# Patient Record
Sex: FEMALE | Race: WHITE | NOT HISPANIC OR LATINO | Employment: OTHER | URBAN - METROPOLITAN AREA
[De-identification: names, ages, dates, MRNs, and addresses within clinical notes are randomized per-mention and may not be internally consistent; named-entity substitution may affect disease eponyms.]

---

## 2018-08-05 ENCOUNTER — OFFICE VISIT (OUTPATIENT)
Dept: URGENT CARE | Age: 83
End: 2018-08-05
Payer: MEDICARE

## 2018-08-05 VITALS
WEIGHT: 106 LBS | TEMPERATURE: 97.7 F | HEART RATE: 60 BPM | HEIGHT: 62 IN | SYSTOLIC BLOOD PRESSURE: 112 MMHG | OXYGEN SATURATION: 97 % | BODY MASS INDEX: 19.51 KG/M2 | DIASTOLIC BLOOD PRESSURE: 57 MMHG | RESPIRATION RATE: 16 BRPM

## 2018-08-05 DIAGNOSIS — S50.811A FOREARM ABRASION, RIGHT, INITIAL ENCOUNTER: Primary | ICD-10-CM

## 2018-08-05 PROCEDURE — G0463 HOSPITAL OUTPT CLINIC VISIT: HCPCS | Performed by: PHYSICIAN ASSISTANT

## 2018-08-05 PROCEDURE — 99203 OFFICE O/P NEW LOW 30 MIN: CPT | Performed by: PHYSICIAN ASSISTANT

## 2018-08-05 RX ORDER — ASPIRIN 81 MG/1
81 TABLET ORAL DAILY
COMMUNITY
End: 2022-05-17

## 2018-08-05 NOTE — PROGRESS NOTES
[facility]        NAME: Adriane Lehman is a 80 y o  female  : 1934    MRN: [id number]  DATE: 2018  TIME: 8:03 PM    Assessment and Plan   Forearm abrasion, right, initial encounter [S50 [id number]]  1  Forearm abrasion, right, initial encounter           Patient Instructions       Follow up with PCP in 3-5 days  Proceed to  ER if symptoms worsen  Chief Complaint     Chief Complaint   Patient presents with   Veronika Heaton 83     since yesterday; no pain; only redness         History of Present Illness       A 3year-old female presents today with a abnormal looking eliseo on right forearm  Concerned that it potentially is a tick with a tick bite  No fevers or chills  Does remember doing anything to injure herself  No numbness or tingling in extremity      Other   This is a new problem  The current episode started yesterday  The problem occurs constantly  The problem has been unchanged  Pertinent negatives include no chest pain, coughing, fatigue, fever, nausea or visual change  Nothing aggravates the symptoms  She has tried nothing for the symptoms  The treatment provided no relief  Review of Systems   Review of Systems   Constitutional: Negative  Negative for fatigue and fever  HENT: Negative  Respiratory: Negative  Negative for cough  Cardiovascular: Negative  Negative for chest pain  Gastrointestinal: Negative for nausea  Musculoskeletal: Negative  Skin: Positive for wound           Current Medications       Current Outpatient Prescriptions:     aspirin (ECOTRIN LOW STRENGTH) 81 mg EC tablet, Take 81 mg by mouth daily, Disp: , Rfl:     metoprolol tartrate (LOPRESSOR) 25 mg tablet, Take 25 mg by mouth every 12 (twelve) hours, Disp: , Rfl:     Current Allergies     Allergies as of 2018    (No Known Allergies)            The following portions of the patient's history were reviewed and updated as appropriate: allergies, current medications, past family history, past medical history, past social history, past surgical history and problem list      Past Medical History:   Diagnosis Date    CHF (congestive heart failure) ([facility] [address] )        No past surgical history on file  History reviewed  No pertinent family history  Medications have been verified  Objective   /57   Pulse 60   Temp 97 7 °F (36 5 °C)   Resp 16   Ht 5' 2" (1 575 m)   Wt 48 1 kg (106 lb)   SpO2 97%   BMI 19 39 kg/m²        Physical Exam     Physical Exam   Constitutional: She is oriented to person, place, and time  She appears well-developed and well-nourished  No distress  HENT:   Head: Normocephalic and atraumatic  Right Ear: External ear normal    Left Ear: External ear normal    Nose: Nose normal    Mouth/Throat: Oropharynx is clear and moist  No oropharyngeal exudate  Eyes: Conjunctivae are normal  Right eye exhibits no discharge  Left eye exhibits no discharge  Neck: Normal range of motion  Neck supple  Cardiovascular: Normal rate and intact distal pulses  Pulmonary/Chest: Effort normal  No respiratory distress  Musculoskeletal: Normal range of motion  Left forearm: Normal    Lymphadenopathy:     She has no cervical adenopathy  Neurological: She is alert and oriented to person, place, and time  Skin: Skin is warm and dry  Psychiatric: She has a normal mood and affect  Nursing note and vitals reviewed

## 2018-08-06 NOTE — PATIENT INSTRUCTIONS
Follow up with PCP in 3-5 days  Proceed to  ER if symptoms worsen  Abrasion   AMBULATORY CARE:   An abrasion  is a scrape on your skin  It happens when your skin rubs against a rough surface  Some examples of an abrasion include rug burn, a skinned elbow, or road rash  Abrasions can be many shapes and sizes  The wound may hurt, bleed, bruise, or swell  Seek care immediately if:   · The bleeding does not stop after 10 minutes of firm pressure  · You cannot rinse one or more foreign objects out of your wound  · You have red streaks on your skin coming from your wound  Contact your healthcare provider if:   · You have a fever or chills  · Your abrasion is red, warm, swollen, or draining pus  · You have questions or concerns about your condition or care  Care for your abrasion:   · Wash your hands and dry them with a clean towel  · Press a clean cloth against your wound to stop any bleeding  · Rinse your wound with a lot of clean water  Do not use harsh soap, alcohol, or iodine solutions  · Use a clean, wet cloth to remove any objects, such as small pieces of rocks or dirt  · Rub antibiotic ointment on your wound  This may help prevent infection and help your wound heal     · Cover the wound with a non-stick bandage  Change the bandage daily, and if gets wet or dirty  Follow up with your healthcare provider as directed:  Write down your questions so you remember to ask them during your visits  © 2017 [address] [employer] Information is for End User's use only and may not be sold, redistributed or otherwise used for commercial purposes  All illustrations and images included in CareNotes® are the copyrighted property of [employer] , [employer]  or Odell Castrejon  The above information is an  only  It is not intended as medical advice for individual conditions or treatments   Talk to your doctor, nurse or pharmacist before following any medical regimen to see if it is safe and effective for you

## 2021-01-11 ENCOUNTER — NURSING HOME VISIT (OUTPATIENT)
Dept: GERIATRICS | Facility: OTHER | Age: 86
End: 2021-01-11
Payer: MEDICARE

## 2021-01-11 DIAGNOSIS — E87.1 HYPONATREMIA: ICD-10-CM

## 2021-01-11 DIAGNOSIS — U07.1 COVID-19 VIRUS INFECTION: Primary | ICD-10-CM

## 2021-01-11 DIAGNOSIS — I42.8 NONISCHEMIC CARDIOMYOPATHY (HCC): ICD-10-CM

## 2021-01-11 DIAGNOSIS — D69.6 THROMBOCYTOPENIA (HCC): ICD-10-CM

## 2021-01-11 DIAGNOSIS — N18.31 STAGE 3A CHRONIC KIDNEY DISEASE (HCC): ICD-10-CM

## 2021-01-11 DIAGNOSIS — I48.0 PAROXYSMAL ATRIAL FIBRILLATION (HCC): ICD-10-CM

## 2021-01-11 PROBLEM — R77.8 ELEVATED TROPONIN: Status: ACTIVE | Noted: 2020-12-31

## 2021-01-11 PROBLEM — R79.89 ELEVATED TROPONIN: Status: ACTIVE | Noted: 2020-12-31

## 2021-01-11 PROBLEM — D72.810 LYMPHOPENIA: Status: ACTIVE | Noted: 2021-01-01

## 2021-01-11 PROBLEM — I24.89 DEMAND ISCHEMIA: Status: ACTIVE | Noted: 2021-01-01

## 2021-01-11 PROBLEM — I34.0 MR (MITRAL REGURGITATION): Status: ACTIVE | Noted: 2017-03-21

## 2021-01-11 PROBLEM — I24.8 DEMAND ISCHEMIA (HCC): Status: ACTIVE | Noted: 2021-01-01

## 2021-01-11 PROCEDURE — 99306 1ST NF CARE HIGH MDM 50: CPT | Performed by: INTERNAL MEDICINE

## 2021-01-11 RX ORDER — DEXAMETHASONE 6 MG/1
6 TABLET ORAL DAILY
COMMUNITY
Start: 2021-01-09 | End: 2021-01-12

## 2021-01-11 NOTE — ASSESSMENT & PLAN NOTE
On admission to the hospital patient sodium was 130 and improved on discharge will repeat BMP to monitor

## 2021-01-11 NOTE — ASSESSMENT & PLAN NOTE
Completed remdesivir and now completing dexamethasone  Continue current supportive treatment  Continue to encourage oral intake which patient has been having trouble with  Rehab consult

## 2021-01-11 NOTE — PROGRESS NOTES
[facility] notes  SHORT TERM REHAB       NAME: Louis Motley  AGE: 80 y o  SEX: female    DATE OF ENCOUNTER: 1/11/2021    Assessment and Plan   COVID-19 virus infection  Completed remdesivir and now completing dexamethasone  Continue current supportive treatment  Continue to encourage oral intake which patient has been having trouble with  Rehab consult     Nonischemic cardiomyopathy ([address] )  EF normal  Not on diuretics  Continue monitoring for signs of CHF but seems to have resolved   Currently normovolemic     CKD (chronic kidney disease)  GFR improved to 60 but was in [place] on CKDIII when she went to the hospital due to poor oral intake   Will repeat BMP to monitor kidney function     Thrombocytopenia ([address] )  Does look like resolved but will repeat CBC to make sure trending in the right direction     Hyponatremia  On admission to the hospital patient sodium was 130 and improved on discharge will repeat BMP to monitor    Paroxysmal atrial fibrillation ([address] )  Reate controlled  Continue metoprolol  Stopped taking eliquis due to bruising   Continue monitoring       Chief Complaint     Nausea is improving     History of Present Illness     81 yo female seen for admission to [address] [place] [address] after hospitalization  History obtained from patient  As per patient she was was having diarrhea, nausea and could not seem to eat anything  She was diagnosed with COVID 19 and since she was not getting better she decide to go to the hospital along with her  who was also not well  Patient was admitted to the hospital and did briefly require oxygen supplement  She completed course of remdesivir and since she was improving was discharged to [address] [place] [address] for therapy  Reviewed patient CT of the abdomen report from the hospital and also labs  Also called and talked to daughter who confirmed the history and wanted to know if they will be able to go back home   Did discuss with her about needing to see how they manage with therapy before answering that question and she does understands it       PMHx     Past Medical History:   Diagnosis Date    CHF (congestive heart failure) (HCC)     CKD (chronic kidney disease)      Past Surgical History:   Procedure Laterality Date    NO PAST SURGERIES       Family History   Problem Relation Age of Onset    Heart disease Mother     Heart disease Father     Prostate cancer Father     Heart failure Brother      Social History     Socioeconomic History    Marital status: /Civil Union     Spouse name: None    Number of children: None    Years of education: None    Highest education level: None   Occupational History    None   Social Needs    Financial resource strain: None    Food insecurity     Worry: None     Inability: None    Transportation needs     Medical: None     Non-medical: None   Tobacco Use    Smoking status: Never Smoker    Smokeless tobacco: Never Used   Substance and Sexual Activity    Alcohol use: Never     Frequency: Never    Drug use: Never    Sexual activity: None   Lifestyle    Physical activity     Days per week: None     Minutes per session: None    Stress: None   Relationships    Social connections     Talks on phone: None     Gets together: None     Attends Judaism service: None     Active member of club or organization: None     Attends meetings of clubs or organizations: None     Relationship status: None    Intimate partner violence     Fear of current or ex partner: None     Emotionally abused: None     Physically abused: None     Forced sexual activity: None   Other Topics Concern    None   Social History Narrative    None     Allergies   Allergen Reactions    Amoxicillin Diarrhea       Review of Systems     Nausea is improving   Appetite is better  Denies any pain or shortness of breath  All other review of system negative        Objective   Vital signs:  BP: 96/65  HR: 92  RR: 18  TEMP: 97 1F  SAT : 98% on RA    PHYSICAL EXAM:  GENERAL: no acute distress  SKIN: warm, dry, no rash, no cyanosis  HEENT: normocephalic, atraumatic, no JVD, no Thyromegaly, no lymphadenopathy  LUNGS: CTA, no wheezing, no rales, expanded equally, no chest tenderness   HEART: normal rhythm, normal rate, no murmur, no gallop  ABDOMEN: soft non tender non distended bs+, no guarding or rebound tenderness  :  no suprapubic tenderness  MUSCULOSKELETAL: strength about 4+/5 all extremities, ROM within normal, bilateral lower leg edema pitting +1, no calf tenderness  NEUROLOGY: awake, alert, Ox3, able to recall 3/3 object but had no difficulty with placing the time on the clock  CN2-12 intact  PSYCH: cooperative, pleasant  Pertinent Laboratory/Diagnostic Studies:  Recent labs and diagnostic tests reviewed in nursing home EMR    Current Medications   Medications reviewed and signed off on nursing home EMR

## 2021-01-11 NOTE — ASSESSMENT & PLAN NOTE
GFR improved to 60 but was in WAYNE on CKDIII when she went to the hospital due to poor oral intake   Will repeat BMP to monitor kidney function

## 2021-01-11 NOTE — ASSESSMENT & PLAN NOTE
EF normal  Not on diuretics  Continue monitoring for signs of CHF but seems to have resolved   Currently normovolemic

## 2021-01-18 ENCOUNTER — NURSING HOME VISIT (OUTPATIENT)
Dept: GERIATRICS | Facility: OTHER | Age: 86
End: 2021-01-18
Payer: MEDICARE

## 2021-01-18 DIAGNOSIS — U07.1 COVID-19 VIRUS INFECTION: Primary | ICD-10-CM

## 2021-01-18 PROCEDURE — 99308 SBSQ NF CARE LOW MDM 20: CPT | Performed by: INTERNAL MEDICINE

## 2021-01-18 NOTE — PROGRESS NOTES
[facility] [address] notes  SHORT TERM REHAB       NAME: Marisol Hunt  AGE: 80 y o  SEX: female    DATE OF ENCOUNTER: 1/18/2021    Assessment and Plan   COVID-19 virus infection  Appetite improving as per staff  Continue current meds  Labs done on 1/13/21 reviewed no acute findings  Continues to work with therapy  Reviewed therapy notes      Chief Complaint     No new complaints    History of Present Illness     81 yo female seen for follow up after admission to [facility]  Patient seen for her COVID 23 and continues to slowly recover  Still needs some encouragement with intake  Reviewed nursing notes since last visit      PMHx     Past Medical History:   Diagnosis Date    CHF (congestive heart failure) (HCC)     CKD (chronic kidney disease)      Past Surgical History:   Procedure Laterality Date    NO PAST SURGERIES       Family History   Problem Relation Age of Onset    Heart disease Mother     Heart disease Father     Prostate cancer Father     Heart failure Brother      Social History     Socioeconomic History    Marital status: /Civil Union     Spouse name: Not on file    Number of children: Not on file    Years of education: Not on file    Highest education level: Not on file   Occupational History    Not on file   Social Needs    Financial resource strain: Not on file    Food insecurity     Worry: Not on file     Inability: Not on file   [employer] needs     Medical: Not on file     Non-medical: Not on file   Tobacco Use    Smoking status: Never Smoker    Smokeless tobacco: Never Used   Substance and Sexual Activity    Alcohol use: Never     Frequency: Never    Drug use: Never    Sexual activity: Not on file   Lifestyle    Physical activity     Days per week: Not on file     Minutes per session: Not on file    Stress: Not on file   Relationships    Social connections     Talks on phone: Not on file     Gets together: Not on file     Attends Gnosticism service: Not on file     Active member of club or organization: Not on file     Attends meetings of clubs or organizations: Not on file     Relationship status: Not on file    Intimate partner violence     Fear of current or ex partner: Not on file     Emotionally abused: Not on file     Physically abused: Not on file     Forced sexual activity: Not on file   Other Topics Concern    Not on file   Social History Narrative    Not on file     Allergies   Allergen Reactions    Amoxicillin Diarrhea       Review of Systems     Denies any pain  Weak and tired  All other review of system negative        Objective   Vital signs:  BP: 104/58  HR: 80  RR: 17  TEMP: 98 0F  SAT : 99% on RA    PHYSICAL EXAM:  GENERAL: no acute distress  SKIN: warm, dry, no rash, no cyanosis  HEENT: normocephalic, atraumatic, no JVD, no Thyromegaly, no lymphadenopathy  LUNGS: CTA, no wheezing, no rales, expanded equally, no chest tenderness   HEART: normal rhythm, normal rate, no murmur, no gallop  ABDOMEN: soft non tender non distended bs+, no guarding or rebound tenderness  :  no suprapubic tenderness  MUSCULOSKELETAL: strength about 4+/5 all extremities, ROM within normal, no calf tenderness  NEUROLOGY: awake, alert, Ox3, CN2-12 intact  PSYCH: cooperative, pleasant         Pertinent Laboratory/Diagnostic Studies:  Recent labs and diagnostic tests reviewed in nursing home EMR    Current Medications   Medications reviewed

## 2021-01-18 NOTE — ASSESSMENT & PLAN NOTE
Appetite improving as per staff  Continue current meds  Labs done on 1/13/21 reviewed no acute findings  Continues to work with therapy  Reviewed therapy notes

## 2021-01-25 ENCOUNTER — NURSING HOME VISIT (OUTPATIENT)
Dept: GERIATRICS | Facility: OTHER | Age: 86
End: 2021-01-25
Payer: MEDICARE

## 2021-01-25 DIAGNOSIS — U07.1 COVID-19 VIRUS INFECTION: Primary | ICD-10-CM

## 2021-01-25 DIAGNOSIS — I50.23 ACUTE ON CHRONIC SYSTOLIC CONGESTIVE HEART FAILURE (HCC): ICD-10-CM

## 2021-01-25 DIAGNOSIS — E87.1 HYPONATREMIA: ICD-10-CM

## 2021-01-25 DIAGNOSIS — D69.6 THROMBOCYTOPENIA (HCC): ICD-10-CM

## 2021-01-25 DIAGNOSIS — I24.8 DEMAND ISCHEMIA (HCC): ICD-10-CM

## 2021-01-25 DIAGNOSIS — I48.0 PAROXYSMAL ATRIAL FIBRILLATION (HCC): ICD-10-CM

## 2021-01-25 DIAGNOSIS — N18.31 STAGE 3A CHRONIC KIDNEY DISEASE (HCC): ICD-10-CM

## 2021-01-25 PROCEDURE — 99316 NF DSCHRG MGMT 30 MIN+: CPT | Performed by: INTERNAL MEDICINE

## 2021-01-25 NOTE — PROGRESS NOTES
[facility] [address] notes  [address] Discharge summary      NAME: Miranda Dickerson  AGE: 80 y o  SEX: female    DATE OF ENCOUNTER: 1/25/2021    Assessment and Plan   COVID-19 virus infection  Seems to be doing better  Reviewed therapy notes  Does complaint of some cough and phlegm, will get CXR   If CXR does shows anything will send her on antibiotics   Encourage to use the incentive spirometry   Continue with activity as tolerable at home    Acute on chronic systolic congestive heart failure (HCC)  Weight was monitored   Not on diuretics  Weight has been stable at the facility and no signs of edema of the extremities  Continue monitoring at home            Paroxysmal atrial fibrillation (HCC)  Rate controlled on current meds  Continue current meds  Continue monitoring symptoms at home    CKD (chronic kidney disease)  GFR is 60 on 1/13/21  Continue monitoring as out patient with PCP    Hyponatremia  Hyponatremia resolved, 142 on 1/13/21    Thrombocytopenia (HCC)  Platelet was 149 which is normal 1/13/21      Demand ischemia ([facility])  Continue ASA and betablockers        Chief Complaint     Had some chest symptoms but not really pain, cough with yellow phlegm  History of Present Illness     81 yo female seen for discharge patient was admitted to [facility] after a hospitalization for COVID 19  Patient continue with therapy and slowly improved enough to be discharged now  Reviewed nursing notes since last visit       PMHx     Past Medical History:   Diagnosis Date    CHF (congestive heart failure) (HCC)     CKD (chronic kidney disease)      Past Surgical History:   Procedure Laterality Date    NO PAST SURGERIES       Family History   Problem Relation Age of Onset    Heart disease Mother     Heart disease Father     Prostate cancer Father     Heart failure Brother      Social History     Socioeconomic History    Marital status: /Civil Union     Spouse name: Not on file    Number of children: Not on file    Years of education: Not on file    Highest education level: Not on file   Occupational History    Not on file   Social Needs    Financial resource strain: Not on file    Food insecurity     Worry: Not on file     Inability: Not on file    Transportation needs     Medical: Not on file     Non-medical: Not on file   Tobacco Use    Smoking status: Never Smoker    Smokeless tobacco: Never Used   Substance and Sexual Activity    Alcohol use: Never     Frequency: Never    Drug use: Never    Sexual activity: Not on file   Lifestyle    Physical activity     Days per week: Not on file     Minutes per session: Not on file    Stress: Not on file   Relationships    Social connections     Talks on phone: Not on file     Gets together: Not on file     Attends Moravian service: Not on file     Active member of club or organization: Not on file     Attends meetings of clubs or organizations: Not on file     Relationship status: Not on file    Intimate partner violence     Fear of current or ex partner: Not on file     Emotionally abused: Not on file     Physically abused: Not on file     Forced sexual activity: Not on file   Other Topics Concern    Not on file   Social History Narrative    Not on file     Allergies   Allergen Reactions    Amoxicillin Diarrhea       Review of Systems     Denies any pain at present time or shortness of breath  Does have cough with yellow phlegm   All other review of system negative      Objective   Vital signs:  BP: 110/51  HR: 68  RR: 18  TEMP: 98 4F  SAT : 99% on RA    PHYSICAL EXAM:  GENERAL: no acute distress  SKIN: warm, dry, no rash, no cyanosis  HEENT: normocephalic, atraumatic, no JVD, no Thyromegaly, no lymphadenopathy  LUNGS: CTA, no wheezing, no rales, expanded equally, no chest tenderness   HEART: normal rhythm, normal rate, no murmur, no gallop  ABDOMEN: soft non tender non distended bs+, no guarding or rebound tenderness  :  no suprapubic tenderness  MUSCULOSKELETAL: strength about 4+/5 all extremities, no edema, no calf tenderness  NEUROLOGY: awake, alert, Ox3  CN2-12 intact  PSYCH: cooperative, pleasant  Pertinent Laboratory/Diagnostic Studies:  Recent labs and diagnostic tests reviewed in nursing home EMR    Current Medications  Medications reviewed with patient/family  Prescriptions provided for medications needed    Prescriptions provided for services needed    Time spend on patient discharge 35 minutes

## 2021-01-25 NOTE — ASSESSMENT & PLAN NOTE
Weight was monitored   Not on diuretics  Weight has been stable at the facility and no signs of edema of the extremities  Continue monitoring at home

## 2021-02-04 ENCOUNTER — OFFICE VISIT (OUTPATIENT)
Dept: GERIATRICS | Facility: CLINIC | Age: 86
End: 2021-02-04
Payer: MEDICARE

## 2021-02-04 DIAGNOSIS — U07.1 COVID-19 VIRUS INFECTION: ICD-10-CM

## 2021-02-04 DIAGNOSIS — R53.83 FATIGUE, UNSPECIFIED TYPE: ICD-10-CM

## 2021-02-04 DIAGNOSIS — H93.13 TINNITUS OF BOTH EARS: ICD-10-CM

## 2021-02-04 DIAGNOSIS — I42.8 NONISCHEMIC CARDIOMYOPATHY (HCC): Primary | ICD-10-CM

## 2021-02-04 DIAGNOSIS — I48.0 PAROXYSMAL ATRIAL FIBRILLATION (HCC): ICD-10-CM

## 2021-02-04 DIAGNOSIS — M54.50 ACUTE RIGHT-SIDED LOW BACK PAIN WITHOUT SCIATICA: ICD-10-CM

## 2021-02-04 PROCEDURE — 99214 OFFICE O/P EST MOD 30 MIN: CPT | Performed by: INTERNAL MEDICINE

## 2021-02-04 NOTE — ASSESSMENT & PLAN NOTE
Maybe due COVID 19 but will order CBC, CMP and TSH prior to next visit     Continue monitoring symptoms

## 2021-02-04 NOTE — PROGRESS NOTES
[facility]    NAME: Rose Day  AGE: 80 y o  SEX: female    DATE OF ENCOUNTER: 2/4/2021    Assessment and Plan   Nonischemic cardiomyopathy ([address] )  EF is normal now  Continues to follow up with cardiology  Continue monitoring for symptoms   Currently normovolemic   If continues to have exertional dyspnea will consider repeating ECHO    COVID-19 virus infection  Seems to be doing better  Eating better  Encourage to try some nutritional supplement  Only exertional dyspnea symptoms still present     Acute right-sided low back pain without sciatica  Since starting to do therapy  The pain is worst with therapy  Multifactorial such as deconditioning, muscle atrophy and scoliosis   Recommended to try topical gel such as biofreeze or bengay  Also ordered PT to eval and treat  Continue activity as tolerable    Fatigue  Maybe due COVID 19 but will order CBC, CMP and TSH prior to next visit  Continue monitoring symptoms    Paroxysmal atrial fibrillation (HCC)  Rate controlled  She did have discussion with cardiology about eliquis and does not want it still  Continue metoprolol  On ASA but asked to try to hold for a week and see if it helps resolve her chronic tinnitus if not go ahead and restart it  Tinnitus of both ears  Chronic and has been present for years  Thinks not associated with ASA as she thinks aspirin was started after  But recommended to try off aspirin for a week and see if it helps         Chief Complaint     Right lower back pain    History of Present Illness     81 yo female seen for new patient visit  Patient was recently admitted to the hospital for [place] 19 and then discharge to rehab  She was managed in rehab and sent home  She currently comes for a new patient visit  She complaints of right lower back pain with activity and does not radiate  She states it is sharp  She also complaints of buzzing noise in her ears but has been present for years   Only other symptoms is exertional dyspnea since her COVID 19       PMHx     Past Medical History:   Diagnosis Date    CHF (congestive heart failure) (HCC)     CKD (chronic kidney disease)      Past Surgical History:   Procedure Laterality Date    NO PAST SURGERIES       Family History   Problem Relation Age of Onset    Heart disease Mother     Heart disease Father     Prostate cancer Father     Heart failure Brother      Social History     Socioeconomic History    Marital status: /Civil Union     Spouse name: Not on file    Number of children: Not on file    Years of education: Not on file    Highest education level: Not on file   Occupational History    Not on file   Social Needs    Financial resource strain: Not on file    Food insecurity     Worry: Not on file     Inability: Not on file   [employer] needs     Medical: Not on file     Non-medical: Not on file   Tobacco Use    Smoking status: Never Smoker    Smokeless tobacco: Never Used   Substance and Sexual Activity    Alcohol use: Never     Frequency: Never    Drug use: Never    Sexual activity: Not on file   Lifestyle    Physical activity     Days per week: Not on file     Minutes per session: Not on file    Stress: Not on file   Relationships    Social connections     Talks on phone: Not on file     Gets together: Not on file     Attends Mosque service: Not on file     Active member of club or organization: Not on file     Attends meetings of clubs or organizations: Not on file     Relationship status: Not on file    Intimate partner violence     Fear of current or ex partner: Not on file     Emotionally abused: Not on file     Physically abused: Not on file     Forced sexual activity: Not on file   Other Topics Concern    Not on file   Social History Narrative    Not on file     Allergies   Allergen Reactions    Amoxicillin Diarrhea       Review of Systems     Denies chest pain, nausea or vomiting  Appetite better but not back to baseline  All other review of system negative      Objective   Vital signs:   BP: 110/60  HR: 59  RR: 18  TEMP: 99 1F  SAT : 97% on RA    PHYSICAL EXAM:  GENERAL: no acute distress  SKIN: warm, dry, no rash, no cyanosis  HEENT: normocephalic, atraumatic, no JVD, no Thyromegaly, no lymphadenopathy, normal left ear but small wax on the right ear  LUNGS: CTA, no wheezing, no rales, expanded equally, no chest tenderness   HEART: normal rhythm, normal rate, no murmur, no gallop  ABDOMEN: soft non tender non distended bs+, no guarding or rebound tenderness  :  no suprapubic tenderness  MUSCULOSKELETAL: strength about 4+ /5 all extremities, ROM within normal, bilateral lower leg edema pitting +1, no calf tenderness  NEUROLOGY: awake, alert, Ox3, CN2-12 intact  PSYCH: cooperative, pleasant         Pertinent Laboratory/Diagnostic Studies:  Recent labs and diagnostic tests reviewed in EMR    Current Medications   Medications reviewed in EMR    Total time spend with patient care 50 minutes

## 2021-02-04 NOTE — ASSESSMENT & PLAN NOTE
EF is normal now  Continues to follow up with cardiology  Continue monitoring for symptoms   Currently normovolemic   If continues to have exertional dyspnea will consider repeating ECHO

## 2021-02-04 NOTE — ASSESSMENT & PLAN NOTE
Problem: Falls - Risk of  Goal: *Absence of Falls  Document Jennifer Fall Risk and appropriate interventions in the flowsheet. Outcome: Progressing Towards Goal  Fall Risk Interventions:  Mobility Interventions: Patient to call before getting OOB, PT Consult for mobility concerns         Medication Interventions: Patient to call before getting OOB, Evaluate medications/consider consulting pharmacy    Elimination Interventions: Patient to call for help with toileting needs, Toilet paper/wipes in reach             Bedside shift change report given to Linda Vee (oncoming nurse) by Katherin Marquis (offgoing nurse). Report included the following information SBAR, Kardex, OR Summary, Procedure Summary, Intake/Output and MAR. Rate controlled  She did have discussion with cardiology about eliquis and does not want it still  Continue metoprolol  On ASA but asked to try to hold for a week and see if it helps resolve her chronic tinnitus if not go ahead and restart it

## 2021-02-04 NOTE — ASSESSMENT & PLAN NOTE
Seems to be doing better  Eating better  Encourage to try some nutritional supplement  Only exertional dyspnea symptoms still present

## 2021-02-04 NOTE — ASSESSMENT & PLAN NOTE
Chronic and has been present for years  Thinks not associated with ASA as she thinks aspirin was started after  But recommended to try off aspirin for a week and see if it helps

## 2021-02-04 NOTE — ASSESSMENT & PLAN NOTE
Since starting to do therapy  The pain is worst with therapy  Multifactorial such as deconditioning, muscle atrophy and scoliosis   Recommended to try topical gel such as biofreeze or bengay  Also ordered PT to eval and treat     Continue activity as tolerable

## 2021-07-21 ENCOUNTER — IN HOME VISIT (OUTPATIENT)
Dept: GERIATRICS | Facility: CLINIC | Age: 86
End: 2021-07-21
Payer: MEDICARE

## 2021-07-21 DIAGNOSIS — I48.0 PAROXYSMAL ATRIAL FIBRILLATION (HCC): Primary | ICD-10-CM

## 2021-07-21 DIAGNOSIS — N18.31 STAGE 3A CHRONIC KIDNEY DISEASE (HCC): ICD-10-CM

## 2021-07-21 DIAGNOSIS — M54.50 ACUTE RIGHT-SIDED LOW BACK PAIN WITHOUT SCIATICA: ICD-10-CM

## 2021-07-21 PROCEDURE — 99335 PR DOM/R-HOME E/M EST PT LW MOD SEVERITY 25 MINUTES: CPT | Performed by: INTERNAL MEDICINE

## 2021-07-21 RX ORDER — METOPROLOL SUCCINATE 25 MG/1
12.5 TABLET, EXTENDED RELEASE ORAL DAILY
COMMUNITY
Start: 2021-05-03

## 2021-07-21 NOTE — PROGRESS NOTES
[facility] in [facility]    NAME: Fahad Cazares  AGE: 80 y o  SEX: female    DATE OF ENCOUNTER: 7/21/2021    Assessment and Plan   Paroxysmal atrial fibrillation (HCC)  Rate is low 40-60s frequently but asymptomatic   Will decreased toprol xl to 12 5 mg po daily  Continue monitoring rate     CKD (chronic kidney disease)  Will get BMP tomorrow to monitor  Continue to encourage fluids    Acute right-sided low back pain without sciatica  Pain is better   Continue with treatments such as prn tylenol  Continue with activity as tolerable         Chief Complaint     No new complaints    History of Present Illness     81 yo female seen for follow up  Patient seen for her afib, CKDIII and back pain  Patient states she is doing well overall but does report she checks her HR and and seems in the 40s at times but no symptoms  Reviewed nursing notes since March 2021  Nursing staff have no concerns at present time        PMHx     Past Medical History:   Diagnosis Date    CHF (congestive heart failure) (HCC)     CKD (chronic kidney disease)      Past Surgical History:   Procedure Laterality Date    NO PAST SURGERIES       Family History   Problem Relation Age of Onset    Heart disease Mother     Heart disease Father     Prostate cancer Father     Heart failure Brother      Social History     Socioeconomic History    Marital status: /Civil Union     Spouse name: Not on file    Number of children: Not on file    Years of education: Not on file    Highest education level: Not on file   Occupational History    Not on file   Tobacco Use    Smoking status: Never Smoker    Smokeless tobacco: Never Used   Substance and Sexual Activity    Alcohol use: Never    Drug use: Never    Sexual activity: Not on file   Other Topics Concern    Not on file   Social History Narrative    Not on file     Social Determinants of Health     Financial Resource Strain:     Difficulty of Paying Living Expenses:    Food Insecurity:     Worried About Running Out of Food in the Last Year:     [address] in the Last Year:    Transportation Needs:     Lack of Transportation (Medical):  Lack of Transportation (Non-Medical):    Physical Activity:     Days of Exercise per Week:     Minutes of Exercise per Session:    Stress:     Feeling of Stress :    Social Connections:     Frequency of Communication with Friends and Family:     Frequency of Social Gatherings with Friends and Family:     Attends Rastafarian Services:     Active Member of Clubs or Organizations:     Attends Club or Organization Meetings:     Marital Status:    Intimate Partner Violence:     Fear of Current or Ex-Partner:     Emotionally Abused:     Physically Abused:     Sexually Abused: Allergies   Allergen Reactions    Amoxicillin Diarrhea       Review of Systems     Denies any pain or shortness of breath  All other review of system negative      Objective   Vital signs:   BP: 119/54  HR: 61  RR: 16  TEMP: 97 4F    PHYSICAL EXAM:  GENERAL: no acute distress  SKIN: warm, dry, no rash, no cyanosis  HEENT: normocephalic, atraumatic, no JVD, no Thyromegaly, no lymphadenopathy  LUNGS: CTA, no wheezing, no rales, expanded equally, no chest tenderness   HEART: normal rhythm, normal rate, no murmur, no gallop  ABDOMEN: soft non tender non distended bs+, no guarding or rebound tenderness  :  no suprapubic tenderness  MUSCULOSKELETAL: strength about 5 /5 all extremities, ROM within normal, bilateral lower leg edema trace, no calf tenderness  NEUROLOGY: awake, alert, Ox3, CN2-12 intact  PSYCH: cooperative, pleasant         Pertinent Laboratory/Diagnostic Studies:  Recent labs and diagnostic tests reviewed in EMR    Current Medications   Medications reviewed in EMR and facility

## 2021-07-21 NOTE — ASSESSMENT & PLAN NOTE
Rate is low 40-60s frequently but asymptomatic   Will decreased toprol xl to 12 5 mg po daily  Continue monitoring rate

## 2021-10-18 ENCOUNTER — IN HOME VISIT (OUTPATIENT)
Dept: GERIATRICS | Facility: CLINIC | Age: 86
End: 2021-10-18
Payer: MEDICARE

## 2021-10-18 DIAGNOSIS — M54.6 ACUTE MIDLINE THORACIC BACK PAIN: Primary | ICD-10-CM

## 2021-10-18 PROCEDURE — 99336 PR DOM/R-HOME E/M EST PT MOD HI SEVERITY 40 MINUTES: CPT | Performed by: INTERNAL MEDICINE

## 2021-10-18 RX ORDER — CALCIUM CARBONATE/VITAMIN D3 600 MG-10
1 TABLET ORAL
COMMUNITY

## 2021-10-18 RX ORDER — ASCORBIC ACID 500 MG
500 TABLET ORAL DAILY
COMMUNITY

## 2021-10-18 RX ORDER — ACETAMINOPHEN 500 MG
500 TABLET ORAL EVERY 8 HOURS PRN
COMMUNITY

## 2022-02-03 ENCOUNTER — IN HOME VISIT (OUTPATIENT)
Dept: GERIATRICS | Facility: CLINIC | Age: 87
End: 2022-02-03
Payer: MEDICARE

## 2022-02-03 DIAGNOSIS — I48.0 PAROXYSMAL ATRIAL FIBRILLATION (HCC): Primary | ICD-10-CM

## 2022-02-03 DIAGNOSIS — R19.8 GI SYMPTOMS: ICD-10-CM

## 2022-02-03 DIAGNOSIS — M54.6 ACUTE MIDLINE THORACIC BACK PAIN: ICD-10-CM

## 2022-02-03 DIAGNOSIS — N18.2 STAGE 2 CHRONIC KIDNEY DISEASE: ICD-10-CM

## 2022-02-03 PROCEDURE — 99336 PR DOM/R-HOME E/M EST PT MOD HI SEVERITY 40 MINUTES: CPT | Performed by: INTERNAL MEDICINE

## 2022-02-03 NOTE — ASSESSMENT & PLAN NOTE
HR better since decreasing medications  Continue current dose   Continue monitoring rate and symptoms

## 2022-02-03 NOTE — PROGRESS NOTES
[facility] in [place] 2nd FLOOR    NAME: Corey Rondon  AGE: 80 y o  SEX: female    DATE OF ENCOUNTER: 2/3/2022    Assessment and Plan   Paroxysmal atrial fibrillation (HCC)  HR better since decreasing medications  Continue current dose   Continue monitoring rate and symptoms    CKD (chronic kidney disease)  Resolved  Last BMP 10/19/21 normal   Continue monitoring     Acute midline thoracic back pain  Seems to be better  Continue with prn tylenol    GI symptoms  Sometimes gets cramping in the abdomen and starts having diarrhea which last few days then slowly resolves during which time at night has chills but no fevers  Currently symptoms resolving and it seems to happen cyclic   Labs done last time all negative CMP and CBC   Discussed about abdominal US if it occurs next time and informed to let staff know and if that does not show anything may need to do abdominal CT scan which was discussed with patient  She will let me know if happens again about the ultrasound  Encouraged fluids  Also monitor foods she eats when the symptoms starts          Chief Complaint     GI symptoms    History of Present Illness     81 yo female seen for follow up visit  Patient seen for her afib, CKD, back pain and GI symptoms  Reviewed nursing notes since last visit       PMHx     Past Medical History:   Diagnosis Date    CHF (congestive heart failure) (HCC)     CKD (chronic kidney disease)      Past Surgical History:   Procedure Laterality Date    NO PAST SURGERIES       Family History   Problem Relation Age of Onset    Heart disease Mother     Heart disease Father     Prostate cancer Father     Heart failure Brother      Social History     Socioeconomic History    Marital status: /Civil Union     Spouse name: Not on file    Number of children: Not on file    Years of education: Not on file    Highest education level: Not on file   Occupational History    Not on file   Tobacco Use    Smoking status: Never Smoker    Smokeless tobacco: Never Used   Substance and Sexual Activity    Alcohol use: Never    Drug use: Never    Sexual activity: Not on file   Other Topics Concern    Not on file   Social History Narrative    Not on file     Social Determinants of Health     Financial Resource Strain: Not on file   Food Insecurity: Not on file   Transportation Needs: Not on file   Physical Activity: Not on file   Stress: Not on file   Social Connections: Not on file   Intimate Partner Violence: Not on file   Housing Stability: Not on file     Allergies   Allergen Reactions    Amoxicillin Diarrhea       Review of Systems     Diarrhea which is resolving   Had chills at nights but no more  All other review of system negative        Objective   Vital signs:   BP: 123/55  HR: 71  RR: 16  TEMP: 97 8F    PHYSICAL EXAM:  GENERAL: no acute distress  SKIN: warm, dry, no rash, no cyanosis  HEENT: normocephalic, atraumatic, no JVD, no Thyromegaly, no lymphadenopathy  LUNGS: CTA, no wheezing, no rales, expanded equally, no chest tenderness   HEART: normal rhythm, normal rate, no murmur, no gallop  ABDOMEN: soft non tender non distended bs+, no guarding or rebound tenderness  :  no suprapubic tenderness  MUSCULOSKELETAL: strength about 5 /5 all extremities, ROM within normal, no calf tenderness  NEUROLOGY: awake, alert, Ox3, CN2-12 intact  PSYCH: cooperative, pleasant         Pertinent Laboratory/Diagnostic Studies:  Recent labs and diagnostic tests reviewed in EMR    Current Medications   Medications reviewed in EMR and facility

## 2022-02-03 NOTE — ASSESSMENT & PLAN NOTE
Sometimes gets cramping in the abdomen and starts having diarrhea which last few days then slowly resolves during which time at night has chills but no fevers  Currently symptoms resolving and it seems to happen cyclic   Labs done last time all negative CMP and CBC   Discussed about abdominal US if it occurs next time and informed to let staff know and if that does not show anything may need to do abdominal CT scan which was discussed with patient  She will let me know if happens again about the ultrasound     Encouraged fluids  Also monitor foods she eats when the symptoms starts

## 2022-03-07 ENCOUNTER — IN HOME VISIT (OUTPATIENT)
Dept: GERIATRICS | Facility: CLINIC | Age: 87
End: 2022-03-07
Payer: MEDICARE

## 2022-03-07 DIAGNOSIS — Z13.6 SCREENING FOR CARDIOVASCULAR CONDITION: ICD-10-CM

## 2022-03-07 DIAGNOSIS — Z00.00 MEDICARE ANNUAL WELLNESS VISIT, SUBSEQUENT: ICD-10-CM

## 2022-03-07 PROCEDURE — G0438 PPPS, INITIAL VISIT: HCPCS | Performed by: INTERNAL MEDICINE

## 2022-03-07 NOTE — PROGRESS NOTES
Assessment and Plan:     Problem List Items Addressed This Visit     None      Visit Diagnoses     Medicare annual wellness visit, subsequent        Relevant Orders    Lipid panel    Screening for cardiovascular condition        Relevant Orders    Lipid panel           Preventive health issues were discussed with patient, and age appropriate screening tests were ordered as noted in patient's After Visit Summary  Personalized health advice and appropriate referrals for health education or preventive services given if needed, as noted in patient's After Visit Summary       History of Present Illness:     Patient presents for Medicare Annual Wellness visit    Patient Care Team:  Jaison Iverson MD as PCP - General (Geriatric Medicine)     Problem List:     Patient Active Problem List   Diagnosis    Acute on chronic systolic congestive heart failure (HCC)    CKD (chronic kidney disease)    COVID-19 virus infection    Elevated troponin    Hypokalemia    Lymphopenia    MR (mitral regurgitation)    Demand ischemia (HCC)    Nonischemic cardiomyopathy (HCC)    Paroxysmal atrial fibrillation (HCC)    Pre-diabetes    Thrombocytopenia (HCC)    Hyponatremia    Acute right-sided low back pain without sciatica    Fatigue    Tinnitus of both ears    Acute midline thoracic back pain    GI symptoms      Past Medical and Surgical History:     Past Medical History:   Diagnosis Date    CHF (congestive heart failure) (HCC)     CKD (chronic kidney disease)      Past Surgical History:   Procedure Laterality Date    NO PAST SURGERIES        Family History:     Family History   Problem Relation Age of Onset    Heart disease Mother     Heart disease Father     Prostate cancer Father     Heart failure Brother       Social History:     Social History     Socioeconomic History    Marital status: /Civil Union     Spouse name: Not on file    Number of children: Not on file    Years of education: Not on file   Digna Courser Highest education level: Not on file   Occupational History    Not on file   Tobacco Use    Smoking status: Never Smoker    Smokeless tobacco: Never Used   Substance and Sexual Activity    Alcohol use: Never    Drug use: Never    Sexual activity: Not on file   Other Topics Concern    Not on file   Social History Narrative    Not on file     Social Determinants of Health     Financial Resource Strain: Not on file   Food Insecurity: Not on file   Transportation Needs: Not on file   Physical Activity: Not on file   Stress: Not on file   Social Connections: Not on file   Intimate Partner Violence: Not on file   Housing Stability: Not on file     Lives at facility  Some stress due to  illness  Does not drive any longer   No history of any violence  Housing stable as they live at personal care   Physically active and gets around the facility      Medications and Allergies:     Current Outpatient Medications   Medication Sig Dispense Refill    acetaminophen (TYLENOL) 500 mg tablet Take 500 mg by mouth every 8 (eight) hours as needed      ascorbic acid (VITAMIN C) 500 MG tablet Take 500 mg by mouth daily      aspirin (ECOTRIN LOW STRENGTH) 81 mg EC tablet Take 81 mg by mouth daily      Calcium Carb-Cholecalciferol (Calcium-Vitamin D) 600-400 MG-UNIT TABS Take 1 tablet by mouth every other day      metoprolol succinate (TOPROL-XL) 25 mg 24 hr tablet Take 12 5 mg by mouth daily      Multiple Vitamin (TAB-A-OFE PO) Take 1 tablet by mouth daily       No current facility-administered medications for this visit  Allergies   Allergen Reactions    Amoxicillin Diarrhea      Immunizations:     Immunizations up to date and kept at the facility records including [address] done on 10/26/21     Health Maintenance: There are no preventive care reminders to display for this patient        Topic Date Due    COVID-19 Vaccine (1) Never done    Pneumococcal Vaccine: 65+ Years (1 of 2 - PPSV23) Never done  DTaP,Tdap,and Td Vaccines (1 - Tdap) Never done    Influenza Vaccine (1) 09/01/2021      Medicare Health Risk Assessment:   Vital sign:   /51  HR 64  RR 16  Temp 98 0F    Discussed about DEXA scan and patient had refused and aware of the benefit vs the risks of it    Patient no longer requiring mammogram or colonoscopy  Will get lipid panel, CBC and CMP at the facility       Annual Wellness Visit    Susan Serna MD

## 2022-03-07 NOTE — PATIENT INSTRUCTIONS
Medicare Preventive Visit Patient Instructions  Thank you for completing your Welcome to Medicare Visit or Medicare Annual Wellness Visit today  Your next wellness visit will be due in one year (3/8/2023)  The screening/preventive services that you may require over the next 5-10 years are detailed below  Some tests may not apply to you based off risk factors and/or age  Screening tests ordered at today's visit but not completed yet may show as past due  Also, please note that scanned in results may not display below  Preventive Screenings:  Service Recommendations Previous Testing/Comments   Colorectal Cancer Screening  * Colonoscopy    * Fecal Occult Blood Test (FOBT)/Fecal Immunochemical Test (FIT)  * Fecal DNA/Cologuard Test  * Flexible Sigmoidoscopy Age: 54-65 years old   Colonoscopy: every 10 years (may be performed more frequently if at higher risk)  OR  FOBT/FIT: every 1 year  OR  Cologuard: every 3 years  OR  Sigmoidoscopy: every 5 years  Screening may be recommended earlier than age 48 if at higher risk for colorectal cancer  Also, an individualized decision between you and your healthcare provider will decide whether screening between the ages of 74-80 would be appropriate  Colonoscopy: Not on file  FOBT/FIT: Not on file  Cologuard: Not on file  Sigmoidoscopy: Not on file          Breast Cancer Screening Age: 36 years old  Frequency: every 1-2 years  Not required if history of left and right mastectomy Mammogram: Not on file        Cervical Cancer Screening Between the ages of 21-29, pap smear recommended once every 3 years  Between the ages of 33-67, can perform pap smear with HPV co-testing every 5 years     Recommendations may differ for women with a history of total hysterectomy, cervical cancer, or abnormal pap smears in past  Pap Smear: Not on file        Hepatitis C Screening Once for adults born between [facility]  More frequently in patients at high risk for Hepatitis C Hep C Antibody: Not on file        Diabetes Screening 1-2 times per year if you're at risk for diabetes or have pre-diabetes Fasting glucose: No results in last 5 years   A1C: No results in last 5 years        Cholesterol Screening Once every 5 years if you don't have a lipid disorder  May order more often based on risk factors  Lipid panel: 07/15/2020          Other Preventive Screenings Covered by Medicare:  1  Abdominal Aortic Aneurysm (AAA) Screening: covered once if your at risk  You're considered to be at risk if you have a family history of AAA  2  Lung Cancer Screening: covers low dose CT scan once per year if you meet all of the following conditions: (1) Age 50-69; (2) No signs or symptoms of lung cancer; (3) Current smoker or have quit smoking within the last 15 years; (4) You have a tobacco smoking history of at least 30 pack years (packs per day multiplied by number of years you smoked); (5) You get a written order from a healthcare provider  3  Glaucoma Screening: covered annually if you're considered high risk: (1) You have diabetes OR (2) Family history of glaucoma OR (3)  aged 48 and older OR (3)  American aged 72 and older  3  Osteoporosis Screening: covered every 2 years if you meet one of the following conditions: (1) You're estrogen deficient and at risk for osteoporosis based off medical history and other findings; (2) Have a vertebral abnormality; (3) On glucocorticoid therapy for more than 3 months; (4) Have primary hyperparathyroidism; (5) On osteoporosis medications and need to assess response to drug therapy  · Last bone density test (DXA Scan): Not on file  5  HIV Screening: covered annually if you're between the age of 12-76  Also covered annually if you are younger than 13 and older than 72 with risk factors for HIV infection  For pregnant patients, it is covered up to 3 times per pregnancy      Immunizations:  Immunization Recommendations   Influenza Vaccine Annual influenza vaccination during flu season is recommended for all persons aged >= 6 months who do not have contraindications   Pneumococcal Vaccine (Prevnar and Pneumovax)  * Prevnar = PCV13  * Pneumovax = PPSV23   Adults 25-60 years old: 1-3 doses may be recommended based on certain risk factors  Adults 72 years old: Prevnar (PCV13) vaccine recommended followed by Pneumovax (PPSV23) vaccine  If already received PPSV23 since turning 65, then PCV13 recommended at least one year after PPSV23 dose  Hepatitis B Vaccine 3 dose series if at intermediate or high risk (ex: diabetes, end stage renal disease, liver disease)   Tetanus (Td) Vaccine - COST NOT COVERED BY MEDICARE PART B Following completion of primary series, a booster dose should be given every 10 years to maintain immunity against tetanus  Td may also be given as tetanus wound prophylaxis  Tdap Vaccine - COST NOT COVERED BY MEDICARE PART B Recommended at least once for all adults  For pregnant patients, recommended with each pregnancy  Shingles Vaccine (Shingrix) - COST NOT COVERED BY MEDICARE PART B  2 shot series recommended in those aged 48 and above     Health Maintenance Due:  There are no preventive care reminders to display for this patient  Immunizations Due:      Topic Date Due    COVID-19 Vaccine (1) Never done    Pneumococcal Vaccine: 65+ Years (1 of 2 - PPSV23) Never done    DTaP,Tdap,and Td Vaccines (1 - Tdap) Never done    Influenza Vaccine (1) 09/01/2021     Advance Directives   What are advance directives? Advance directives are legal documents that state your wishes and plans for medical care  These plans are made ahead of time in case you lose your ability to make decisions for yourself  Advance directives can apply to any medical decision, such as the treatments you want, and if you want to donate organs  What are the types of advance directives? There are many types of advance directives, and each state has rules about how to use them  You may choose a combination of any of the following:  · Living will: This is a written record of the treatment you want  You can also choose which treatments you do not want, which to limit, and which to stop at a certain time  This includes surgery, medicine, IV fluid, and tube feedings  · Durable power of  for healthcare [facility]): This is a written record that states who you want to make healthcare choices for you when you are unable to make them for yourself  This person, called a proxy, is usually a family member or a friend  You may choose more than 1 proxy  · Do not resuscitate (DNR) order:  A DNR order is used in case your heart stops beating or you stop breathing  It is a request not to have certain forms of treatment, such as CPR  A DNR order may be included in other types of advance directives  · Medical directive: This covers the care that you want if you are in a coma, near death, or unable to make decisions for yourself  You can list the treatments you want for each condition  Treatment may include pain medicine, surgery, blood transfusions, dialysis, IV or tube feedings, and a ventilator (breathing machine)  · Values history: This document has questions about your views, beliefs, and how you feel and think about life  This information can help others choose the care that you would choose  Why are advance directives important? An advance directive helps you control your care  Although spoken wishes may be used, it is better to have your wishes written down  Spoken wishes can be misunderstood, or not followed  Treatments may be given even if you do not want them  An advance directive may make it easier for your family to make difficult choices about your care  © Copyright [employer] 2018 Information is for End User's use only and may not be sold, redistributed or otherwise used for commercial purposes   All illustrations and images included in CareNotes® are the copyrighted property of A D A M , Inc  or [address] [place] [address]

## 2022-05-17 ENCOUNTER — IN HOME VISIT (OUTPATIENT)
Dept: GERIATRICS | Facility: CLINIC | Age: 87
End: 2022-05-17
Payer: MEDICARE

## 2022-05-17 DIAGNOSIS — G89.29 CHRONIC BILATERAL LOW BACK PAIN WITHOUT SCIATICA: Primary | ICD-10-CM

## 2022-05-17 DIAGNOSIS — K59.09 OTHER CONSTIPATION: ICD-10-CM

## 2022-05-17 DIAGNOSIS — F43.21 FEELING GRIEF: ICD-10-CM

## 2022-05-17 DIAGNOSIS — M54.50 CHRONIC BILATERAL LOW BACK PAIN WITHOUT SCIATICA: Primary | ICD-10-CM

## 2022-05-17 PROCEDURE — 99336 PR DOM/R-HOME E/M EST PT MOD HI SEVERITY 40 MINUTES: CPT | Performed by: INTERNAL MEDICINE

## 2022-05-17 RX ORDER — SENNA AND DOCUSATE SODIUM 50; 8.6 MG/1; MG/1
1 TABLET, FILM COATED ORAL DAILY
COMMUNITY

## 2022-05-17 NOTE — PROGRESS NOTES
FELLOWSHIP [facility] PERSONAL CARE in [place] 2nd FLOOR    NAME: Maria R Vazquez  AGE: 80 y o  SEX: female    DATE OF ENCOUNTER: 5/17/2022    Assessment and Plan   Chronic bilateral low back pain without sciatica  Has been chronic but worst as of late  Will get xray of the back  Continue tylenol as needed  Will also add some bengay to the lower back   If xray negative will get PT eval   Continue monitoring symptoms     Other constipation  Seems to be constipated  Will start on senna S and see how patient does       Feeling grief   just passed away   Discussed with patient about her mood   Counseled her about her options  Informed if she needs anything to let the staff or family know who can relay information to me  Also discussed about psychiatrist which she did not want at present time   Continue with supportive care       Chief Complaint     Bloated feeling at times  Back pain which is worst as of late    History of Present Illness     81 yo female seen for follow up  Patient seen due to her back pain, grief and constipation  As per patient she has been having problem with her back pain which is chronic but now worst  She denies any trauma  She also report that her knees and thigh hurts and maybe due to all the activity she has been doing since her  passing  Patient also reports feeling bloated and stools being hard  Reviewed nursing notes since last visit       PMHx     Past Medical History:   Diagnosis Date    CHF (congestive heart failure) (HCC)     CKD (chronic kidney disease)      Past Surgical History:   Procedure Laterality Date    NO PAST SURGERIES       Family History   Problem Relation Age of Onset    Heart disease Mother     Heart disease Father     Prostate cancer Father     Heart failure Brother      Social History     Socioeconomic History    Marital status: /Civil Union     Spouse name: Not on file    Number of children: Not on file    Years of education: Not on file   Jair Contreras Highest education level: Not on file   Occupational History    Not on file   Tobacco Use    Smoking status: Never Smoker    Smokeless tobacco: Never Used   Substance and Sexual Activity    Alcohol use: Never    Drug use: Never    Sexual activity: Not on file   Other Topics Concern    Not on file   Social History Narrative    Not on file     Social Determinants of Health     Financial Resource Strain: Not on file   Food Insecurity: Not on file   Transportation Needs: Not on file   Physical Activity: Not on file   Stress: Not on file   Social Connections: Not on file   Intimate Partner Violence: Not on file   Housing Stability: Not on file     Allergies   Allergen Reactions    Amoxicillin Diarrhea       Review of Systems   Does feel down   All other review of system negative        Objective   Vital signs:   BP: 117/68  HR: 64  RR: 16  TEMP: 98 2F    PHYSICAL EXAM:  GENERAL: no acute distress  SKIN: warm, dry, no rash, no cyanosis  HEENT: normocephalic, atraumatic, no JVD, no Thyromegaly, no lymphadenopathy  LUNGS: CTA, no wheezing, no rales, expanded equally, no chest tenderness   HEART: normal rhythm, normal rate, no murmur, no gallop  ABDOMEN: soft non tender non distended bs+, no guarding or rebound tenderness  :  no suprapubic tenderness  MUSCULOSKELETAL: strength about 4+ /5 all extremities, ROM within normal, no calf tenderness  NEUROLOGY: awake, alert, Ox3, CN2-12 intact     PSYCH: cooperative, pleasant, looks slightly depressed       Pertinent Laboratory/Diagnostic Studies:  Recent labs and diagnostic tests reviewed in EMR    Current Medications   Medications reviewed in EMR and facility      Total time spend with patient care 45 minutes

## 2022-05-17 NOTE — ASSESSMENT & PLAN NOTE
Has been chronic but worst as of late  Will get xray of the back  Continue tylenol as needed  Will also add some bengay to the lower back   If xray negative will get PT eval   Continue monitoring symptoms

## 2022-05-17 NOTE — ASSESSMENT & PLAN NOTE
just passed away   Discussed with patient about her mood   Counseled her about her options  Informed if she needs anything to let the staff or family know who can relay information to me  Also discussed about psychiatrist which she did not want at present time   Continue with supportive care

## 2022-09-22 ENCOUNTER — IN HOME VISIT (OUTPATIENT)
Dept: GERIATRICS | Facility: CLINIC | Age: 87
End: 2022-09-22
Payer: MEDICARE

## 2022-09-22 DIAGNOSIS — R10.2 PELVIC PAIN: Primary | ICD-10-CM

## 2022-09-22 PROCEDURE — 99336 PR DOM/R-HOME E/M EST PT MOD HI SEVERITY 40 MINUTES: CPT | Performed by: INTERNAL MEDICINE

## 2022-09-22 NOTE — PROGRESS NOTES
FELLOWSHIP [facility] PERSONAL CARE in [place] 2nd FLOOR    NAME: Sade Diggs  AGE: 80 y o  SEX: female    DATE OF ENCOUNTER: 9/22/2022    Assessment and Plan   Pelvic pain  Had xrays done negative  Has been working with PT and states symptoms are worst and had to back down with therapy  Continue with ibuprofen and tylenol  Will get CT of the abdomen and pelvis  The is located in the suprapubic area below the umbilical area (lower abdomen)  She reports there is discomfort but the pain is significantly worst with ambulation and also limiting her ambulation at times requiring wheelchair  Concern still present maybe a fracture not visible in the Xray or some other pelvic or lower abdomen lesions causing symptoms  No issues bowels  Reviewed physiatrist recommendations also         Chief Complaint     Pain in the pubic and sacral area     History of Present Illness     81 yo female seen as per request by patient  As per patient the pain is has been getting worst in her pubic and sacral area  The pain has been going on for over a month and not improving  It is limiting her ability to function and walk  Pain is located in the pubic/below umbilical and sacral area  Reports very difficult to explain exactly what is the pain is like  Patient reports is constantly there but fluctuates and made worst by activity  Patient also has lost 4 lbs since January  Reviewed nursing notes since last visit       PMHx     Past Medical History:   Diagnosis Date    CHF (congestive heart failure) (HCC)     CKD (chronic kidney disease)      Past Surgical History:   Procedure Laterality Date    NO PAST SURGERIES       Family History   Problem Relation Age of Onset    Heart disease Mother     Heart disease Father     Prostate cancer Father     Heart failure Brother      Social History     Socioeconomic History    Marital status: /Civil Union     Spouse name: Not on file    Number of children: Not on file    Years of education: Not on file    Highest education level: Not on file   Occupational History    Not on file   Tobacco Use    Smoking status: Never Smoker    Smokeless tobacco: Never Used   Substance and Sexual Activity    Alcohol use: Never    Drug use: Never    Sexual activity: Not on file   Other Topics Concern    Not on file   Social History Narrative    Not on file     Social Determinants of Health     Financial Resource Strain: Not on file   Food Insecurity: Not on file   Transportation Needs: Not on file   Physical Activity: Not on file   Stress: Not on file   Social Connections: Not on file   Intimate Partner Violence: Not on file   Housing Stability: Not on file     Allergies   Allergen Reactions    Amoxicillin Diarrhea       Review of Systems     Pain in the pubic/sacral and lower abdomen  Difficulty with ambulating due to pain  All other review of system negative        Objective   Vital signs reviewed from facility records  PHYSICAL EXAM:  GENERAL: no acute distress  SKIN: warm, dry, no rash, no cyanosis  HEENT: normocephalic, atraumatic, no JVD, no Thyromegaly, no lymphadenopathy  LUNGS: CTA, no wheezing, no rales, expanded equally, no chest tenderness   HEART: normal rhythm, normal rate, no murmur, no gallop  ABDOMEN: soft non tender non distended bs+, no guarding or rebound tenderness  :  no suprapubic tenderness  MUSCULOSKELETAL: strength about 4+/5 all extremities, ROM within normal, no calf tenderness  Grimaces with walking and standing  Uses a walker to walk  NEUROLOGY: awake, alert, Ox3, CN2-12 intact  PSYCH: cooperative, pleasant         Pertinent Laboratory/Diagnostic Studies:  Recent labs and diagnostic tests reviewed in EMR    Current Medications   Medications reviewed in EMR and facility

## 2022-09-22 NOTE — ASSESSMENT & PLAN NOTE
Had xrays done negative  Has been working with PT and states symptoms are worst and had to back down with therapy  Continue with ibuprofen and tylenol  Will get CT of the abdomen and pelvis  The is located in the suprapubic area below the umbilical area (lower abdomen)  She reports there is discomfort but the pain is significantly worst with ambulation and also limiting her ambulation at times requiring wheelchair     Concern still present maybe a fracture not visible in the Xray or some other pelvic or lower abdomen lesions causing symptoms  No issues bowels

## 2022-10-05 ENCOUNTER — HOSPITAL ENCOUNTER (OUTPATIENT)
Dept: RADIOLOGY | Facility: HOSPITAL | Age: 87
Discharge: HOME | End: 2022-10-05
Payer: MEDICARE

## 2022-10-05 DIAGNOSIS — R10.2 COMBINED ABDOMINAL AND PELVIC PAIN: ICD-10-CM

## 2022-10-05 DIAGNOSIS — R10.9 COMBINED ABDOMINAL AND PELVIC PAIN: ICD-10-CM

## 2022-10-05 PROCEDURE — 74177 CT ABD & PELVIS W/CONTRAST: CPT

## 2022-10-05 RX ADMIN — IOHEXOL 85 ML: 350 INJECTION, SOLUTION INTRAVENOUS at 18:31

## 2022-10-10 ENCOUNTER — IN HOME VISIT (OUTPATIENT)
Dept: GERIATRICS | Facility: CLINIC | Age: 87
End: 2022-10-10
Payer: MEDICARE

## 2022-10-10 DIAGNOSIS — S32.592A CLOSED FRACTURE OF MULTIPLE PUBIC RAMI, LEFT, INITIAL ENCOUNTER (HCC): Primary | ICD-10-CM

## 2022-10-10 DIAGNOSIS — E27.8 ADRENAL GLAND CYST (HCC): ICD-10-CM

## 2022-10-10 PROCEDURE — 99336 PR DOM/R-HOME E/M EST PT MOD HI SEVERITY 40 MINUTES: CPT | Performed by: INTERNAL MEDICINE

## 2022-10-10 RX ORDER — TRAMADOL HYDROCHLORIDE 50 MG/1
25 TABLET ORAL EVERY 6 HOURS PRN
COMMUNITY

## 2022-10-10 RX ORDER — TRAMADOL HYDROCHLORIDE 50 MG/1
25 TABLET ORAL EVERY MORNING
COMMUNITY
End: 2022-10-17

## 2022-10-10 RX ORDER — ALENDRONATE SODIUM 70 MG/1
70 TABLET ORAL
COMMUNITY

## 2022-10-10 NOTE — ASSESSMENT & PLAN NOTE
Will get an ultrasound of the left adrenal as per radiologist recommendations  Patient also agreeable

## 2022-10-10 NOTE — PROGRESS NOTES
FELLOWSHIP [facility] PERSONAL CARE in [place] 2nd FLOOR    NAME: Ronny Bustamante  AGE: 80 y o  SEX: female    DATE OF ENCOUNTER: 10/10/2022    Assessment and Plan   Closed fracture of multiple pubic rami, left, initial encounter ([facility] [address] )  CT report reviewed - showing fractures of the sacrum near sacroilliac joint bilaterally and left pubic rami inferior and superior fractures - reviewed result with grand daughter and patient separately   Most likely insufficiency fracture   Will start patient on tramadol schedule in the am for a week with prn for few weeks  Continue with activity as tolerable  Discussed with therapy about findings   WBAT for now  Will get orthopedic consult  Continue to use walker for support  Pain slightly better as per patient  Discontinue ibuprofen  Will start fosamax   Increase calcium/vit D to daily   Continue tylenol as needed    [facility]  Will get an ultrasound of the left adrenal as per radiologist recommendations  Patient also agreeable       Chief Complaint     Pain in the pubic area     History of Present Illness     79 yo female seen for follow up after an abnormal CT scan report  Patient had a CT scan ordered after continued to have complaints of pain in the left pubic area even after negative xray  The CT scan showed multiple different fractures and results were discussed with the patient  Reviewed CT scan report       PMHx     Past Medical History:   Diagnosis Date   • CHF (congestive heart failure) ([facility] [address] )    • CKD (chronic kidney disease)      Past Surgical History:   Procedure Laterality Date   • NO PAST SURGERIES       Family History   Problem Relation Age of Onset   • Heart disease Mother    • Heart disease Father    • Prostate cancer Father    • Heart failure Brother      Social History     Socioeconomic History   • Marital status: /Civil Union     Spouse name: Not on file   • Number of children: Not on file   • Years of education: Not on file   • Highest education level: Not on file   Occupational History   • Not on file   Tobacco Use   • Smoking status: Never Smoker   • Smokeless tobacco: Never Used   Substance and Sexual Activity   • Alcohol use: Never   • Drug use: Never   • Sexual activity: Not on file   Other Topics Concern   • Not on file   Social History Narrative   • Not on file     Social Determinants of Health     Financial Resource Strain: Not on file   Food Insecurity: Not on file   Transportation Needs: Not on file   Physical Activity: Not on file   Stress: Not on file   Social Connections: Not on file   Intimate Partner Violence: Not on file   Housing Stability: Not on file     Allergies   Allergen Reactions   • Amoxicillin Diarrhea       Review of Systems     Pain in the left pubic area and worst with walking   All other review of system negative      Objective   Vital signs reviewed from facility records  PHYSICAL EXAM:  GENERAL: no acute distress  SKIN: warm, dry, no rash, no cyanosis  HEENT: normocephalic, atraumatic, no JVD, no Thyromegaly, no lymphadenopathy  LUNGS: CTA, no wheezing, no rales, expanded equally, no chest tenderness   HEART: normal rhythm, normal rate, no murmur, no gallop  ABDOMEN: soft non tender non distended bs+, no guarding or rebound tenderness  :  no suprapubic tenderness  MUSCULOSKELETAL: strength about 4+ /5 all extremities, ROM within normal, no calf tenderness  NEUROLOGY: awake, alert, Ox3, CN2-12 intact  PSYCH: cooperative, pleasant         Pertinent Laboratory/Diagnostic Studies:  Recent labs and diagnostic tests reviewed in EMR    Current Medications   Medications reviewed in EMR and facility

## 2022-10-10 NOTE — ASSESSMENT & PLAN NOTE
CT report reviewed - showing fractures of the sacrum near sacroilliac joint bilaterally and left pubic rami inferior and superior fractures - reviewed result with grand daughter and patient separately   Most likely insufficiency fracture   Will start patient on tramadol schedule in the am for a week with prn for few weeks  Continue with activity as tolerable  Discussed with therapy about findings   WBAT for now  Will get orthopedic consult  Continue to use walker for support  Pain slightly better as per patient  Discontinue ibuprofen  Will start fosamax   Increase calcium/vit D to daily   Continue tylenol as needed

## 2022-10-18 ENCOUNTER — OFFICE VISIT (OUTPATIENT)
Dept: OBGYN CLINIC | Facility: MEDICAL CENTER | Age: 87
End: 2022-10-18
Payer: MEDICARE

## 2022-10-18 VITALS
DIASTOLIC BLOOD PRESSURE: 73 MMHG | HEART RATE: 65 BPM | SYSTOLIC BLOOD PRESSURE: 145 MMHG | WEIGHT: 106 LBS | BODY MASS INDEX: 19.51 KG/M2 | HEIGHT: 62 IN

## 2022-10-18 DIAGNOSIS — S32.592A CLOSED FRACTURE OF MULTIPLE PUBIC RAMI, LEFT, INITIAL ENCOUNTER (HCC): Primary | ICD-10-CM

## 2022-10-18 DIAGNOSIS — S32.10XA CLOSED FRACTURE OF SACRUM, UNSPECIFIED PORTION OF SACRUM, INITIAL ENCOUNTER (HCC): ICD-10-CM

## 2022-10-18 PROCEDURE — 99203 OFFICE O/P NEW LOW 30 MIN: CPT | Performed by: STUDENT IN AN ORGANIZED HEALTH CARE EDUCATION/TRAINING PROGRAM

## 2022-10-18 RX ORDER — MELATONIN
2000 DAILY
Qty: 60 TABLET | Refills: 3 | Status: SHIPPED | OUTPATIENT
Start: 2022-10-18

## 2022-10-18 NOTE — PROGRESS NOTES
Hip New Office Note    Assessment:     1  Closed fracture of multiple pubic rami, left, initial encounter ([email or web address] 75 )    2  Closed fracture of sacrum, unspecified portion of sacrum, initial encounter ([email or web address] 75 )        Plan:     Problem List Items Addressed This Visit        Musculoskeletal and Integument    Closed fracture of multiple pubic rami, left, initial encounter ([email or web address] 75 ) - Primary    Relevant Medications    cholecalciferol (VITAMIN D3) 1,000 units tablet      Other Visit Diagnoses     Closed fracture of sacrum, unspecified portion of sacrum, initial encounter ([email or web address] 75 )        Relevant Medications    cholecalciferol (VITAMIN D3) 1,000 units tablet          81 y/o female with low back and bilateral hip pain secondary to insufficiency sacral and pubic rami fractures  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, but this is not her pain generator  CT of the abdomen/pelvis were reviewed today showing that there are fractures of the sacrum on both sides adjacent to the SI joints as well as left sided superior and inferior pubic rami fractures  Discussed that these are insufficiency fractures, which are likely caused by having soft bone  Due to the fractures on both sides of the sacrum, there is concern for a U type sacral fracture pattern  Discussed ordering an MRI to eval further, but she has severe claustrophobia, so she would like to avoid this  Suggest increasing her Vitamin D as well as calcium  She was prescribed Fosamax, but she is unsure of taking this due to the side effects  Continue walking with the walker  Will discuss with the orthopedic trauma team to see if she is a candidate for possible surgical intervention vs conservative management  Subjective:     Patient ID: Adrianna Huggins is a 80 y o  female  Patient seen in consultation at the request of Dr Carine Valero MD    Chief Complaint:  HPI:  She states that she started having pain in August in the left leg  Denies any precipitating trauma or falls    She was taking Tylenol for pain at this time and then started with physical therapy  Initially she felt that physical therapy helped, but then her pain began in the right leg  Her pain continued, so her PCP ordered a CT scan of the abdomen and pelvis to eval for cause of her pelvis and groin pain  It was found that she has pubic rami fractures as well as sacral fractures  She has been taking Tramadol for pain  She was started on Fosamax  Prior to the pain she was ambulating without assistance, she is now ambulating with a walker  Allergy:  Allergies   Allergen Reactions   • Amoxicillin Diarrhea     Medications:  all current active meds have been reviewed  Past Medical History:  Past Medical History:   Diagnosis Date   • CHF (congestive heart failure) ([facility])    • CKD (chronic kidney disease)      Past Surgical History:  Past Surgical History:   Procedure Laterality Date   • NO PAST SURGERIES       Family History:  Family History   Problem Relation Age of Onset   • Heart disease Mother    • Heart disease Father    • Prostate cancer Father    • Heart failure Brother      Social History:  Social History     Substance and Sexual Activity   Alcohol Use Never     Social History     Substance and Sexual Activity   Drug Use Never     Social History     Tobacco Use   Smoking Status Never Smoker   Smokeless Tobacco Never Used           ROS:  General: Per HPI  Skin: Negative, except if noted below  HEENT: Negative  Respiratory: Negative  Cardiovascular: Negative  Gastrointestinal: Negative  Urinary: Negative  Vascular: Negative  Musculoskeletal: Positive per HPI   Neurologic: Positive per HPI  Endocrine: Negative    Objective:  BP Readings from Last 1 Encounters:   10/18/22 145/73      Wt Readings from Last 1 Encounters:   10/18/22 48 1 kg (106 lb)        Respiratory:   non-labored respirations    Lymphatics:  no palpable lymph nodes    Gait and Station:    In wheelchair    Neurologic:   Alert and oriented times 3  Patient with normal sensation except as noted below  Deep tendon reflexes 2+ except as noted in MSK exam    Bilateral Lower Extremity:  Left Hip     Inspection: skin intact    Range of Motion: full wo pain    - log roll    Motor: 5/5 IP/Q/HS/TA/GS    Pulses: 2+ DP / 2+ PT    SILT DP/SP/S/S/TN    Right Hip     Inspection: skin intact    Range of Motion: full wo pain    - log roll    Motor: 5/5 IP/Q/HS/TA/GS    Pulses: 2+ DP / 2+ PT    SILT DP/SP/S/S/TN    Imaging:  CT scan:    Sclerosis and fractures of the sacrum adjacent to and parallel to bilateral sacroiliac joints, most suggestive of sacral insufficiency fracture  Fractures and healing of the anterior left superior and inferior pubic rami, possibly related to insufficiency fractures  No sagittal deformity of the sacrum  BMI:   Estimated body mass index is 19 39 kg/m² as calculated from the following:    Height as of this encounter: 5' 2" (1 575 m)  Weight as of this encounter: 48 1 kg (106 lb)  BSA:   Estimated body surface area is 1 46 meters squared as calculated from the following:    Height as of this encounter: 5' 2" (1 575 m)  Weight as of this encounter: 48 1 kg (106 lb)             Scribe Attestation    I,:  Demetrius Posey PA-C am acting as a scribe while in the presence of the attending physician :       I,:  Zurdo Cooney, DO personally performed the services described in this documentation    as scribed in my presence :

## 2022-10-18 NOTE — PROGRESS NOTES
Hip New Office Note    Assessment:     1  Closed fracture of multiple pubic rami, left, initial encounter ([facility] 75 )    2  Closed fracture of sacrum, unspecified portion of sacrum, initial encounter ([facility] 75 )        Plan:     Problem List Items Addressed This Visit        Musculoskeletal and Integument    Closed fracture of multiple pubic rami, left, initial encounter ([facility] 75 ) - Primary    Relevant Medications    cholecalciferol (VITAMIN D3) 1,000 units tablet      Other Visit Diagnoses     Closed fracture of sacrum, unspecified portion of sacrum, initial encounter ([facility] 75 )        Relevant Medications    cholecalciferol (VITAMIN D3) 1,000 units tablet          81 y/o female with low back and bilateral hip pain secondary to insufficiency sacral and pubic rami fractures  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, but this is not her pain generator  CT of the abdomen/pelvis were reviewed today showing that there are fractures of the sacrum on both sides adjacent to the SI joints as well as left sided superior and inferior pubic rami fractures  Discussed that these are insufficiency fractures, which are likely caused by having soft bone  Due to the fractures on both sides of the sacrum, there is concern for a U type sacral fracture pattern  Discussed ordering an MRI to eval further, but she has severe claustrophobia, so she would like to avoid this  Suggest increasing her Vitamin D as well as calcium  She was prescribed Fosamax, but she is unsure of taking this due to the side effects  Continue walking with the walker  Will discuss with the orthopedic trauma team to see if she is a candidate for possible surgical intervention vs conservative management  Subjective:     Patient ID: Irene Felipe is a 80 y o  female  Patient seen in consultation at the request of Dr Mathew Christensen MD    Chief Complaint:  HPI:  She states that she started having pain in August in the left leg  Denies any precipitating trauma or falls    She was taking Tylenol for pain at this time and then started with physical therapy  Initially she felt that physical therapy helped, but then her pain began in the right leg  Her pain continued, so her PCP ordered a CT scan of the abdomen and pelvis to eval for cause of her pelvis and groin pain  It was found that she has pubic rami fractures as well as sacral fractures  She has been taking Tramadol for pain  She was started on Fosamax  Prior to the pain she was ambulating without assistance, she is now ambulating with a walker  Allergy:  Allergies   Allergen Reactions   • Amoxicillin Diarrhea     Medications:  all current active meds have been reviewed  Past Medical History:  Past Medical History:   Diagnosis Date   • CHF (congestive heart failure) ([facility])    • CKD (chronic kidney disease)      Past Surgical History:  Past Surgical History:   Procedure Laterality Date   • NO PAST SURGERIES       Family History:  Family History   Problem Relation Age of Onset   • Heart disease Mother    • Heart disease Father    • Prostate cancer Father    • Heart failure Brother      Social History:  Social History     Substance and Sexual Activity   Alcohol Use Never     Social History     Substance and Sexual Activity   Drug Use Never     Social History     Tobacco Use   Smoking Status Never Smoker   Smokeless Tobacco Never Used           ROS:  General: Per HPI  Skin: Negative, except if noted below  HEENT: Negative  Respiratory: Negative  Cardiovascular: Negative  Gastrointestinal: Negative  Urinary: Negative  Vascular: Negative  Musculoskeletal: Positive per HPI   Neurologic: Positive per HPI  Endocrine: Negative    Objective:  BP Readings from Last 1 Encounters:   10/18/22 145/73      Wt Readings from Last 1 Encounters:   10/18/22 48 1 kg (106 lb)        Respiratory:   non-labored respirations    Lymphatics:  no palpable lymph nodes    Gait and Station:    In wheelchair    Neurologic:   Alert and oriented times 3  Patient with normal sensation except as noted below  Deep tendon reflexes 2+ except as noted in MSK exam    Bilateral Lower Extremity:  Left Hip     Inspection: skin intact    Range of Motion: full wo pain    - log roll    Motor: 5/5 IP/Q/HS/TA/GS    Pulses: 2+ DP / 2+ PT    SILT DP/SP/S/S/TN    Right Hip     Inspection: skin intact    Range of Motion: full wo pain    - log roll    Motor: 5/5 IP/Q/HS/TA/GS    Pulses: 2+ DP / 2+ PT    SILT DP/SP/S/S/TN    Imaging:  CT scan:    Sclerosis and fractures of the sacrum adjacent to and parallel to bilateral sacroiliac joints, most suggestive of sacral insufficiency fracture  Fractures and healing of the anterior left superior and inferior pubic rami, possibly related to insufficiency fractures  No sagittal deformity of the sacrum  BMI:   Estimated body mass index is 19 39 kg/m² as calculated from the following:    Height as of this encounter: 5' 2" (1 575 m)  Weight as of this encounter: 48 1 kg (106 lb)  BSA:   Estimated body surface area is 1 46 meters squared as calculated from the following:    Height as of this encounter: 5' 2" (1 575 m)  Weight as of this encounter: 48 1 kg (106 lb)             Scribe Attestation    I,:  Amada Lehman PA-C am acting as a scribe while in the presence of the attending physician :       I,:  Alecia Speaker, DO personally performed the services described in this documentation    as scribed in my presence :

## 2022-10-21 DIAGNOSIS — S32.592A CLOSED FRACTURE OF MULTIPLE PUBIC RAMI, LEFT, INITIAL ENCOUNTER (HCC): Primary | ICD-10-CM

## 2022-10-21 DIAGNOSIS — S32.10XA CLOSED FRACTURE OF SACRUM, UNSPECIFIED PORTION OF SACRUM, INITIAL ENCOUNTER (HCC): ICD-10-CM

## 2022-10-25 ENCOUNTER — HOSPITAL ENCOUNTER (OUTPATIENT)
Dept: RADIOLOGY | Facility: HOSPITAL | Age: 87
Discharge: HOME | End: 2022-10-25
Attending: ORTHOPAEDIC SURGERY
Payer: MEDICARE

## 2022-10-25 ENCOUNTER — OFFICE VISIT (OUTPATIENT)
Dept: OBGYN CLINIC | Facility: HOSPITAL | Age: 87
End: 2022-10-25
Payer: MEDICARE

## 2022-10-25 VITALS
DIASTOLIC BLOOD PRESSURE: 71 MMHG | SYSTOLIC BLOOD PRESSURE: 122 MMHG | BODY MASS INDEX: 19.51 KG/M2 | HEIGHT: 62 IN | HEART RATE: 69 BPM | WEIGHT: 106 LBS

## 2022-10-25 DIAGNOSIS — S32.592A CLOSED FRACTURE OF MULTIPLE PUBIC RAMI, LEFT, INITIAL ENCOUNTER (HCC): ICD-10-CM

## 2022-10-25 DIAGNOSIS — S32.10XA CLOSED FRACTURE OF SACRUM, UNSPECIFIED PORTION OF SACRUM, INITIAL ENCOUNTER (HCC): ICD-10-CM

## 2022-10-25 DIAGNOSIS — S32.592A CLOSED FRACTURE OF MULTIPLE PUBIC RAMI, LEFT, INITIAL ENCOUNTER (HCC): Primary | ICD-10-CM

## 2022-10-25 PROCEDURE — 99213 OFFICE O/P EST LOW 20 MIN: CPT | Performed by: ORTHOPAEDIC SURGERY

## 2022-10-25 PROCEDURE — 72190 X-RAY EXAM OF PELVIS: CPT

## 2022-10-25 RX ORDER — IBUPROFEN 600 MG/1
TABLET ORAL
COMMUNITY
Start: 2022-10-07

## 2022-10-25 NOTE — PROGRESS NOTES
Assessment:  1  Closed fracture of multiple pubic rami, left, initial encounter ([facility] )     2  Closed fracture of sacrum, unspecified portion of sacrum, initial encounter ([facility] )         Plan:  Bilateral sacral insufficiency fractures with a left-sided pubic rami fracture concern for U type sacral fracture  She has been having pain for the past 3 months which is not getting better and she is taking narcotics for pain  We recommend transiliac transsacral screw fixation to stabilize fracture, patient and daughter want to think about it  To do next visit:  No follow-ups on file  f/u post op or 3 weeks a repeat pelvic x-ray    The above stated was discussed in layman's terms and the patient expressed understanding  All questions were answered to the patient's satisfaction  Scribe Attestation    I,:   am acting as a scribe while in the presence of the attending physician :       I,:   personally performed the services described in this documentation    as scribed in my presence :             Subjective:   Cosme Mejia is a 80 y o  female ambulates with a walker who presents with lower back and left groin pain  She was referred by Dr Ling Sánchez for U-type sacral fracture  She doesn't recall any recent trauma or falls  she's been have pain since august  shes was sitting on a metal bench for an hour and doing PT for lower back, other than that she has not any inciting events  Pain is b/l lower back and left groin  She does have right hip arthritis but she is not having much right groin pain  She is taking naproxen and tramadol for pain  She has been having increased pain with ambulation  She takes vitamin-D and calcium and it was recommended that she takes a bisphosphonate however she has not started that yet      Review of systems negative unless otherwise specified in HPI    Past Medical History:   Diagnosis Date   • CHF (congestive heart failure) ([facility])    • CKD (chronic kidney disease)        Past Surgical History:   Procedure Laterality Date   • NO PAST SURGERIES         Family History   Problem Relation Age of Onset   • Heart disease Mother    • Heart disease Father    • Prostate cancer Father    • Heart failure Brother        Social History     Occupational History   • Not on file   Tobacco Use   • Smoking status: Never Smoker   • Smokeless tobacco: Never Used   Substance and Sexual Activity   • Alcohol use: Never   • Drug use: Never   • Sexual activity: Not on file         Current Outpatient Medications:   •  acetaminophen (TYLENOL) 500 mg tablet, Take 500 mg by mouth every 8 (eight) hours as needed, Disp: , Rfl:   •  alendronate (FOSAMAX) 70 mg tablet, Take 70 mg by mouth every 7 days, Disp: , Rfl:   •  Calcium Carb-Cholecalciferol (Calcium-Vitamin D) 600-400 MG-UNIT TABS, Take 1 tablet by mouth in the morning, Disp: , Rfl:   •  cholecalciferol (VITAMIN D3) 1,000 units tablet, Take 2 tablets (2,000 Units total) by mouth daily, Disp: 60 tablet, Rfl: 3  •  ibuprofen (MOTRIN) 600 mg tablet, , Disp: , Rfl:   •  metoprolol succinate (TOPROL-XL) 25 mg 24 hr tablet, Take 12 5 mg by mouth daily, Disp: , Rfl:   •  Multiple Vitamin (TAB-A-OFE PO), Take 1 tablet by mouth daily, Disp: , Rfl:   •  traMADol (ULTRAM) 50 mg tablet, Take 25 mg by mouth every 6 (six) hours as needed, Disp: , Rfl:     Allergies   Allergen Reactions   • Amoxicillin Diarrhea            Vitals:    10/25/22 0909   BP: 122/71   Pulse: 69       Objective:  Physical exam  · General: Awake, Alert, Oriented  · Eyes: Pupils equal, round and reactive to light  · Heart: regular rate and rhythm  · Lungs: No audible wheezing  · Abdomen: soft                    Right Hip Exam     Tenderness   The patient is experiencing tenderness in the posterior  Range of Motion   The patient has normal right hip ROM  Muscle Strength   The patient has normal right hip strength      Tests   RUBEN: positive    Other   Erythema: absent  Scars: absent  Sensation: normal  Pulse: present      Left Hip Exam     Tenderness   The patient is experiencing tenderness in the posterior and anterior  Range of Motion   The patient has normal left hip ROM  Muscle Strength   The patient has normal left hip strength  Tests   RUBEN: positive    Other   Erythema: absent  Scars: absent  Sensation: normal  Pulse: present              Diagnostics, reviewed and taken today if performed as documented: The attending physician has personally reviewed the pertinent films in PACS and interpretation is as follows:  X-rays of the pelvis AP, inlet and outlet views demonstrate bilateral sacral insufficiency fractures with left superior pubic rami fracture  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

## 2022-10-26 DIAGNOSIS — S32.592A CLOSED FRACTURE OF MULTIPLE PUBIC RAMI, LEFT, INITIAL ENCOUNTER (HCC): Primary | ICD-10-CM

## 2022-10-26 DIAGNOSIS — S32.10XA CLOSED FRACTURE OF SACRUM, UNSPECIFIED PORTION OF SACRUM, INITIAL ENCOUNTER (HCC): ICD-10-CM

## 2022-11-04 DIAGNOSIS — S32.592A CLOSED FRACTURE OF MULTIPLE PUBIC RAMI, LEFT, INITIAL ENCOUNTER (HCC): Primary | ICD-10-CM

## 2022-11-04 RX ORDER — TRAMADOL HYDROCHLORIDE 50 MG/1
TABLET ORAL
Qty: 30 TABLET | Refills: 0 | Status: SHIPPED | OUTPATIENT
Start: 2022-11-04

## 2022-11-15 ENCOUNTER — OFFICE VISIT (OUTPATIENT)
Dept: OBGYN CLINIC | Facility: HOSPITAL | Age: 87
End: 2022-11-15

## 2022-11-15 ENCOUNTER — HOSPITAL ENCOUNTER (OUTPATIENT)
Dept: RADIOLOGY | Facility: HOSPITAL | Age: 87
Discharge: HOME | End: 2022-11-15
Attending: ORTHOPAEDIC SURGERY

## 2022-11-15 VITALS
BODY MASS INDEX: 19.51 KG/M2 | HEIGHT: 62 IN | DIASTOLIC BLOOD PRESSURE: 70 MMHG | WEIGHT: 106 LBS | SYSTOLIC BLOOD PRESSURE: 150 MMHG | HEART RATE: 58 BPM

## 2022-11-15 DIAGNOSIS — S32.10XA CLOSED FRACTURE OF SACRUM, UNSPECIFIED PORTION OF SACRUM, INITIAL ENCOUNTER (HCC): ICD-10-CM

## 2022-11-15 DIAGNOSIS — S32.592A CLOSED FRACTURE OF MULTIPLE PUBIC RAMI, LEFT, INITIAL ENCOUNTER (HCC): ICD-10-CM

## 2022-11-15 DIAGNOSIS — S32.592A CLOSED FRACTURE OF MULTIPLE PUBIC RAMI, LEFT, INITIAL ENCOUNTER (HCC): Primary | ICD-10-CM

## 2022-11-15 NOTE — PROGRESS NOTES
Assessment:   Diagnosis ICD-10-CM Associated Orders   1  Closed fracture of multiple pubic rami, left, initial encounter ([facility] )  S32 592A    2  Closed fracture of sacrum, unspecified portion of sacrum, initial encounter ([facility] )  S32 10XA        Plan:  · Because the patient is doing well at this time, she does not require surgery  She is healing and recovering well  · In order to wean off of the Tramadol, she should take it every other day for a week, then every 3 days for another week, and then stop taking this medication regularly  She may continue to take Tramadol occasionally as needed for pain  · She may follow-up with us if her pain returns or if she has any other questions or concerns  To do next visit:  PRN    The above stated was discussed in layman's terms and the patient expressed understanding  All questions were answered to the patient's satisfaction  Scribe Attestation    I,:  Ora Robert PA-C am acting as a scribe while in the presence of the attending physician :       I,:  Chandu Malik MD personally performed the services described in this documentation    as scribed in my presence :           Subjective:   Irene Felipe is a 80 y o  female who presents to the office today for bilateral sacral insufficiency fractures with a left-sided pubic rami fracture and concern for U type sacral fracture which has been ongoing for about 3 months  She was having considerable pain at her previous appointment which is starting to resolve  At this time she can walk for 10 minutes before she gets an ache in her left groin  She takes 25 mg of Tramadol a day and wonders if this is masking the pain or if she is actually healing  She does not ant surgery if it can safely be avoided        Review of systems negative unless otherwise specified in HPI    Past Medical History:   Diagnosis Date   • CHF (congestive heart failure) ([facility] )    • CKD (chronic kidney disease)        Past Surgical History: Procedure Laterality Date   • NO PAST SURGERIES         Family History   Problem Relation Age of Onset   • Heart disease Mother    • Heart disease Father    • Prostate cancer Father    • Heart failure Brother        Social History     Occupational History   • Not on file   Tobacco Use   • Smoking status: Never Smoker   • Smokeless tobacco: Never Used   Substance and Sexual Activity   • Alcohol use: Never   • Drug use: Never   • Sexual activity: Not on file         Current Outpatient Medications:   •  acetaminophen (TYLENOL) 500 mg tablet, Take 500 mg by mouth every 8 (eight) hours as needed, Disp: , Rfl:   •  alendronate (FOSAMAX) 70 mg tablet, Take 70 mg by mouth every 7 days, Disp: , Rfl:   •  Calcium Carb-Cholecalciferol (Calcium-Vitamin D) 600-400 MG-UNIT TABS, Take 1 tablet by mouth in the morning, Disp: , Rfl:   •  cholecalciferol (VITAMIN D3) 1,000 units tablet, Take 2 tablets (2,000 Units total) by mouth daily, Disp: 60 tablet, Rfl: 3  •  ibuprofen (MOTRIN) 600 mg tablet, , Disp: , Rfl:   •  metoprolol succinate (TOPROL-XL) 25 mg 24 hr tablet, Take 12 5 mg by mouth daily, Disp: , Rfl:   •  Multiple Vitamin (TAB-A-OFE PO), Take 1 tablet by mouth daily, Disp: , Rfl:   •  traMADol (ULTRAM) 50 mg tablet, GIVE 1/2 TABLET (25MG) BY MOUTH ONE TIME DAILY IN THE MORNING FOR PAIN GIVE 1/2 TABLET (25MG) BY MOUTH EVERY 6 HOURS AS NEEDED FOR PAIN, Disp: 30 tablet, Rfl: 0    Allergies   Allergen Reactions   • Amoxicillin Diarrhea            Vitals:    11/15/22 0943   BP: 150/70   Pulse: 58       Objective:    General:  Patient is WDWN, alert and oriented, appears stated age, and is in no acute distress  Musculoskeletal:    Right Hip:    Inspection:  No visible abnormality  Range of Motion:  Hip ROM is normal     Strength:  Hip strength is normal     Left Hip:    Inspection:  No visible abnormality      Range of Motion:  Hip ROM is normal     Strength:  Hip strength is normal         Diagnostics, reviewed and taken today if performed as documented: The attending physician has personally reviewed the pertinent films in PACS and interpretation is as follows:  X-rays of the pelvis AP, inlet and outlet views demonstrate bilateral sacral insufficiency fractures with left superior pubic rami fracture which are largely unchanged in alignment from her previous visit  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

## 2023-02-09 ENCOUNTER — NURSING HOME VISIT (OUTPATIENT)
Dept: GERIATRICS | Facility: OTHER | Age: 88
End: 2023-02-09

## 2023-02-09 DIAGNOSIS — I48.0 PAROXYSMAL ATRIAL FIBRILLATION (HCC): ICD-10-CM

## 2023-02-09 DIAGNOSIS — I50.22 CHRONIC SYSTOLIC (CONGESTIVE) HEART FAILURE (HCC): Primary | ICD-10-CM

## 2023-02-09 DIAGNOSIS — M54.50 CHRONIC BILATERAL LOW BACK PAIN WITHOUT SCIATICA: ICD-10-CM

## 2023-02-09 DIAGNOSIS — G89.29 CHRONIC BILATERAL LOW BACK PAIN WITHOUT SCIATICA: ICD-10-CM

## 2023-02-10 ENCOUNTER — TELEPHONE (OUTPATIENT)
Dept: DERMATOLOGY | Facility: CLINIC | Age: 88
End: 2023-02-10

## 2023-02-10 PROBLEM — I50.22 CHRONIC SYSTOLIC (CONGESTIVE) HEART FAILURE (HCC): Status: ACTIVE | Noted: 2023-02-10

## 2023-02-10 NOTE — TELEPHONE ENCOUNTER
Pt called to cancel her appt for today but then changed her mind    she is going to try to find a ride for today's appt 2/10    She also wanted to keep the one scheduled for 4/21 in case she cannot make today's appt  She will callback and cancel one of them

## 2023-02-10 NOTE — ASSESSMENT & PLAN NOTE
HR goal: < 100/min  BP range (Jan-Feb, 2023) = 103/41 to 152/61  HR range (Jan-Feb, 2023) = 60 to 74/min  Not on BB or anti-coagulation therapy

## 2023-02-10 NOTE — ASSESSMENT & PLAN NOTE
Patient denies any pain on this visit  Actively ambulatory without difficulty  Continue PRN Tylenol  Continue Vit D and Calcium daily supplement

## 2023-02-10 NOTE — ASSESSMENT & PLAN NOTE
Wt Readings from Last 3 Encounters:   11/15/22 48 1 kg (106 lb)   10/25/22 48 1 kg (106 lb)   10/18/22 48 1 kg (106 lb)   Euvolemic on assessment   NO BL LE edema  Ave SBP: 132  Continue weekly weight check  Not on diuretic therapy  Continue Low sodium diet

## 2023-02-10 NOTE — PROGRESS NOTES
[address], [address] [place] [address]  [phone]    NAME: Ana Shepherd  AGE: 80 y o  SEX: female    Progress Note    Location: [facility])  POS: 13    Assessment/Plan:    Chronic systolic (congestive) heart failure (HCC)  Wt Readings from Last 3 Encounters:   11/15/22 48 1 kg (106 lb)   10/25/22 48 1 kg (106 lb)   10/18/22 48 1 kg (106 lb)   Euvolemic on assessment  NO BL LE edema  Ave SBP: 132  Continue weekly weight check  Not on diuretic therapy  Continue Low sodium diet    Chronic bilateral low back pain without sciatica  Patient denies any pain on this visit  Actively ambulatory without difficulty  Continue PRN Tylenol  Continue Vit D and Calcium daily supplement    Paroxysmal atrial fibrillation (HCC)  HR goal: < 100/min  BP range (Jan-Feb, 2023) = 103/41 to 152/61  HR range (Jan-Feb, 2023) = 60 to 74/min  Not on BB or anti-coagulation therapy  Chief complaint / Reason for visit: Follow-up visit    History of Present Illness: This is an 80-year-old female patient admitted at [facility]  Patient is seen and examined today to follow-up acute on chronic medical conditions: Chronic Systolic CHF, Paroxysmal Atrial Fibrillation, Chronic B/L LBP wo sciatica   Patient is out of bed for this visit actively ambulatory-alert, cooperative, calm, very pleasant and not in distress  Patient is verbal with clear coherent speech -oriented to name/birthday/current date and place  Patient acknowledged feeling well on this visit -denies any acute medical concerns during ROS assessment including pain  Patient reported that when she is in her room she does not need her walker to ambulate and toileting however, she uses her walker when she goes outside of room for longer walks  Patient is able to exhibit Get up and Go test without difficulty: WNL  Per nursing, no acute medical concerns for this visit      Nursing and prior provider notes reviewed on this visit  Discussed visit with PCP and nursing supervisor  Review of Systems:  Per history of present illness, all other systems reviewed and negative  HISTORY:  Medical Hx: Reviewed, unchanged  Family Hx: Reviewed, unchanged  Soc Hx: Reviewed,  unchanged    ALLERGY: Reviewed, unchanged  Allergies   Allergen Reactions   • Amoxicillin Diarrhea        PHYSICAL EXAM:  Vital Signs: T98 4F -P67 -R17 BP: 141/54 Spo2: 97% RA  Weight: 98 7lbs (2/1/2023) <= 98 0 lbs (1/1/2023) <= 100 7 lbs (12/1/2022)    General: NAD  Frail stature  Head: Atraumatic  Normocephalic  Eye Exam: anicteric sclera, no discharge, PERRLA, No injection  Wears glasses  Oral Exam: moist mucous membranes, no buccaloropharyngeal erythema, palatine tonsils WNL  Neck Exam: no anterior cervical lymphadenopathy noted, neck supple  Cardiovascular: regular rate, regular rhythm, no murmurs, rubs, or gallops  Pulmonary: no wheeze, no rhonchi, no rales  No chest tenderness  Abdominal: soft, non-tender, nondistended, bowel sounds audible x 4 quadrants  : Non distended bladder  Continent  Extremities and skin: no edema noted, no rashes  Intact skin  Neurological: alert, cooperative and responsive, Oriented x 3, moving all 4 extremities symmetrically  Actively ambulatory - intermittently uses walker  Laboratory results / Imaging reviewed: Hard copy/ies in medical chart  Last labs done on Sept,2022   2    Current Medications: All medications reviewed and updated in Nursing Home Chart    Please note: This note was completed in part utilizing a voice-recognition software may have been used in the preparation of this document  Grammatical errors, random word insertion, spelling mistakes, and incomplete sentences may be an occasional consequence of the system secondary to software limitations, ambient noise and hardware issues   Occasional wrong word or "sound-alike" substitutions may have occurred due to the inherent limitations of voice recognition software  At the time of dictation, efforts were made to edit, clarify and/or correct errors  Interpretation should be guided by context  Please read the chart carefully and recognize, using context, where substitutions have occurred  If you have any questions or concerns about the context, text or information contained within the body of this dictation, please contact myself, the provider, for further clarification        PAGE Ragland  2/10/2023

## 2023-03-28 ENCOUNTER — IN HOME VISIT (OUTPATIENT)
Dept: GERIATRICS | Facility: OTHER | Age: 88
End: 2023-03-28

## 2023-03-28 DIAGNOSIS — Z00.00 MEDICARE ANNUAL WELLNESS VISIT, SUBSEQUENT: Primary | ICD-10-CM

## 2023-03-28 NOTE — PROGRESS NOTES
Reji Enriquez is here for her Subsequent Wellness visit  Last Medicare Wellness visit information reviewed, patient interviewed and updates made to the record today  Health Risk Assessment:   Patient rates overall health as fair  Patient feels that their physical health rating is same  Patient is satisfied with their life  Hearing was rated as same  Patient feels that their emotional and mental health rating is slightly better  Patients states they are never, rarely angry  Patient states they are never, rarely unusually tired/fatigued  Pain experienced in the last 7 days has been none  Patient states that she has experienced weight loss or gain in last 6 months  Patient is alert, cooperative, calm and not in distress  Patient is verbally engaged with clear coherent speech - oriented to name/birthday/current date and place  Patient is residing on [facility] since 2021 ([facility] 24/7 nursing services  Patient acknowledged feeling well - denies any acute medical concerns for this visit including pain  Patient did reported occasional chronic pain to low back but not persistent - relieved with PRN Tylenol  Patient reports sleeping well and maintained baseline appetite  Patient reported feeling safe at current living space/ home  Patient reports that she still manages her finances  Mini-Mental test done today as routine test: 5/5 score  TUG test done today: WNL - steady gait using walker  Depression Screening:   PHQ-9 Score: 0      Fall Risk Screening: In the past year, patient has experienced: no history of falling in past year      Urinary Incontinence Screening:   Patient has not leaked urine accidently in the last six months  Patient reported being actively ambulatory using a walker without difficulty  Able to transfer self to bathroom without assistance  Patient reported that she is able to provide continence care by self      Home Safety:  Patient does not have trouble with stairs inside or outside of their home  Patient has working smoke alarms and has working carbon monoxide detector  Home safety hazards include: none  Patient resides in an [facility] ([address] [place] [address]) with 24/7 nursing care  Patient has easy access to [facility] elevators  Patient reports that she does not use the facility stairs  Nutrition:   Current diet is Regular  With regular texture and thin liquids    Medications:   Patient is not currently taking any over-the-counter supplements  Patient is able to manage medications  Patient on supplements (Calcium + Vit D/ MVI/ Vit D3) and PRN Tylenol (mild pain / fever) only  Activities of Daily Living (ADLs)/Instrumental Activities of Daily Living (IADLs):   Walk and transfer into and out of bed and chair?: Yes  Dress and groom yourself?: Yes    Bathe or shower yourself?: Yes    Feed yourself? Yes  Do your laundry/housekeeping?: Yes  Manage your money, pay your bills and track your expenses?: Yes  Make your own meals?: No    Do your own shopping?: Yes    ADL comments: Patient reported that [facility] cooks all meals and provide snacks and hydration as requested  Patient reported that she still manages her finances  Patient reported that she does her shopping with family who provides private transport/ drives her around to places she wanted  Nursing administers scheduled supplements or PRN Tylenol  Durable Medical Equipment Suppliers  None    Previous Hospitalizations:   Any hospitalizations or ED visits within the last 12 months?: No      Hospitalization Comments: Last known hospitalization: December 2020  Advance Care Planning:   Living will: Yes    Durable POA for healthcare: Yes    Advanced directive: Yes    Advanced directive counseling given: Yes    Five wishes given: No    Patient declined ACP directive: No    End of Life Decisions reviewed with patient: No    Provider agrees with end of life decisions: Yes      Comments: Code status: DNR/No CPR      Cognitive Screening:   Provider or family/friend/caregiver concerned regarding cognition?: No    PREVENTIVE SCREENINGS      Cardiovascular Screening:    General: Screening Current and Risks and Benefits Discussed    Due for: Lipid Panel      Diabetes Screening:     General: Risks and Benefits Discussed      Colorectal Cancer Screening:     General: Screening Not Indicated, Patient Declines and Risks and Benefits Discussed      Breast Cancer Screening:     General: Patient Declines and Screening Not Indicated      Cervical Cancer Screening:    General: Screening Not Indicated and Patient Declines      Osteoporosis Screening:    General: Patient Declines and Risks and Benefits Discussed      Abdominal Aortic Aneurysm (AAA) Screening:        General: Screening Not Indicated and Patient Declines      Lung Cancer Screening:     General: Screening Not Indicated and Patient Declines      Hepatitis C Screening:    General: Screening Not Indicated and Patient Declines      Preventive Screening Comments: Patient declined and or screening recommendations such as Colonoscopy/ breast cancer screening declined by patient  Patient agreeable with continuing laboratory tests such as Lipid testing  Patient declined any other vaccination except annual Influenza  Screening, Brief Intervention, and Referral to Treatment (SBIRT)    Screening  Typical number of drinks in a day: 0  Typical number of drinks in a week: 0  Interpretation: Low risk drinking behavior  Single Item Drug Screening:  How often have you used an illegal drug (including marijuana) or a prescription medication for non-medical reasons in the past year? never    Single Item Drug Screen Score: 0  Interpretation: Negative screen for possible drug use disorder    Brief Intervention  Alcohol & drug use screenings were reviewed  No concerns regarding substance use disorder identified       Patient does not drink alcohol nor have used tobacco     Other Counseling Topics:   Car/seat belt/driving safety, skin self-exam, sunscreen and calcium and vitamin D intake and regular weightbearing exercise  Patient is actively ambulatory with walker when going outside of room  Patient does not use walker or any assistive device when ambulating within room area  Patient reported no fall fall incident at prior or present home  Patient reported daily walking multiple times a day for exercises  Currently working with therapy to check approriateness or safety of using single point cane  Patient takes Calcium with Vit D plus Vit D3 supplement daily  Patient reported that she does not drive but always uses the seatbelt when inside a care or vehicle

## 2023-03-28 NOTE — PROGRESS NOTES
Patient reported that she does not drive but has family members that can drive her to places and appointments  Patient reports that she always use seatbelt when she is in a car

## 2023-05-11 ENCOUNTER — IN HOME VISIT (OUTPATIENT)
Dept: GERIATRICS | Facility: OTHER | Age: 88
End: 2023-05-11

## 2023-05-11 DIAGNOSIS — H61.21 IMPACTED CERUMEN OF RIGHT EAR: ICD-10-CM

## 2023-05-11 DIAGNOSIS — H92.01 RIGHT EAR PAIN: Primary | ICD-10-CM

## 2023-05-11 NOTE — PROGRESS NOTES
[address], [address] [place] [address], [address] [place] [address]  [phone]    NAME: Brenda Smalls  AGE: 80 y o  SEX: female    Progress Note    Location: [facility])  POS: 32 ([facility])    Assessment/Plan:    Right ear pain  Patient reported Right ear discomfort for the last few days after ear flushing by nursing staff  On assessment Right ear (+) impacted cerumen  Removed on this visit without complications  Patient reported relief of symptoms    Impacted cerumen of right ear  Completely blocking ear canal  Removed at bedside on today's visit      Chief complaint / Reason for visit:Acute Visit    History of Present Illness: This is an 70-year-old female patient residing at [facility]  Patient is seen and examined today per nursing request related to patient report of ear pain and sensation of fullness with reduced hearing to Right ear  Per nursing, patient had her ears flushed a few days ago for cerumen but patient reported ear discomfort to right ear as described above a few days ago  Patient is alert and at baseline memntation// orientation on this visit  Patient reported discomfort to right ear: fullness/reduced hearing and pain  Patient reported seeing blood yesterday  On assessment, Right ear with hard impacted cerumen - blocking typanic memberane  Left ear WNL: TM intact and no signs of infection  Cerumen removal done on this visit - no complications seen  Nursing and prior provider notes reviewed on this visit  Discussed visit with PCP and nursing staff/ supervisor  Review of Systems:  Per history of present illness, all other systems reviewed and negative      HISTORY:  Medical Hx: Reviewed, unchanged  Family Hx: Reviewed, unchanged  Soc Hx: Reviewed,  unchanged    ALLERGY: Reviewed, unchanged  Allergies   Allergen Reactions   • Amoxicillin Diarrhea        PHYSICAL EXAM:  Vital Signs: T98 8F -P98 -R16 BP: 114/42 (5/8/2023) SpO2: 97% RA  Weight: 99 3 lbs (5/1/2023)    General: NAD  Well appearing  No acute distress  Head: Atraumatic  Normocephalic  Ear: Right ear impacted cerumen  Left ear WNL  Eye Exam: anicteric sclera, no discharge, PERRLA, No injection  Oral Exam: moist mucous membranes, no buccaloropharyngeal erythema, palatine tonsils WNL  Neck Exam: no anterior cervical lymphadenopathy noted, neck supple  Trachea midline, no carotid bruit, no masses  Cardiovascular: regular rate, regular rhythm, no murmurs, rubs, or gallops  S1 and S2  Pulmonary: no wheeze, no rhonchi, no rales  No chest tenderness  Normal chest wall expansion  Abdominal: soft, non-tender, nondistended, bowel sounds audible x 4 quadrants  No palpable hepatosplenomegaly, no tympany  : Non distended bladder  Extremities and skin: no edema noted, no rashes  Intact skin  Neurological: alert, cooperative and responsive, Oriented x 3  Cranial Nerves II-XII grossly intact, no tics, normal sensation to pressure and light touch   moving all 4 extremities symmetrically    Laboratory results / Imaging reviewed: Hard copy/ies in medical chart  Current Medications: All medications reviewed and updated in Nursing Home Chart      Cerumen Removal Progress note:  Ear cerumen removal    Date/Time: 5/11/2023 11:27 AM  Performed by: PAGE Lynn  Authorized by: PAGE Lynn   Universal Protocol:  Consent: Verbal consent obtained  Risks and benefits: risks, benefits and alternatives were discussed  Consent given by: patient  Patient understanding: patient states understanding of the procedure being performed  Patient identity confirmed: verbally with patient      Patient location:  Bedside  Indications / Diagnosis:  Right ear discomfort; Impacted cerumen  Procedure details:     Local anesthetic:  None    Anesthetic total (drops): NOne      Location:  R ear    Procedure type: curette      Approach:  External and natural orifice    Equipment used:  Otoscope with lighted disposable ear "miya  Sedation:     Sedation type: none  Post-procedure details:     Complication:  None    Hearing quality:  Improved    Patient tolerance of procedure: Tolerated well, no immediate complications  Comments:      Post procedure: Able to assess Right tympanic membrane: intact, no signs of infection  Noted a small area of swelling in the Right external ear canal - likely pimple - slight irritation but no bleeding  Patient reported relief of symptoms  Please note: This note was completed in part utilizing a voice-recognition software may have been used in the preparation of this document  Grammatical errors, random word insertion, spelling mistakes, and incomplete sentences may be an occasional consequence of the system secondary to software limitations, ambient noise and hardware issues  Occasional wrong word or \"sound-alike\" substitutions may have occurred due to the inherent limitations of voice recognition software  At the time of dictation, efforts were made to edit, clarify and/or correct errors  Interpretation should be guided by context  Please read the chart carefully and recognize, using context, where substitutions have occurred  If you have any questions or concerns about the context, text or information contained within the body of this dictation, please contact myself, the provider, for further clarification        PAGE Yee  5/11/2023  "

## 2023-05-11 NOTE — ASSESSMENT & PLAN NOTE
Patient reported Right ear discomfort for the last few days after ear flushing by nursing staff    On assessment Right ear (+) impacted cerumen  Removed on this visit without complications  Patient reported relief of symptoms

## 2023-06-09 ENCOUNTER — IN HOME VISIT (OUTPATIENT)
Dept: GERIATRICS | Facility: OTHER | Age: 88
End: 2023-06-09
Payer: MEDICARE

## 2023-06-09 DIAGNOSIS — I50.22 CHRONIC SYSTOLIC (CONGESTIVE) HEART FAILURE (HCC): Primary | ICD-10-CM

## 2023-06-09 DIAGNOSIS — M54.50 CHRONIC BILATERAL LOW BACK PAIN WITHOUT SCIATICA: ICD-10-CM

## 2023-06-09 DIAGNOSIS — G89.29 CHRONIC BILATERAL LOW BACK PAIN WITHOUT SCIATICA: ICD-10-CM

## 2023-06-09 DIAGNOSIS — I48.0 PAROXYSMAL ATRIAL FIBRILLATION (HCC): ICD-10-CM

## 2023-06-09 PROCEDURE — 99349 HOME/RES VST EST MOD MDM 40: CPT | Performed by: NURSE PRACTITIONER

## 2023-06-09 NOTE — ASSESSMENT & PLAN NOTE
Wt Readings from Last 3 Encounters:   11/15/22 48 1 kg (106 lb)   10/25/22 48 1 kg (106 lb)   10/18/22 48 1 kg (106 lb)   Euvolemic on assessment  Patient reported baseline cardiopulmonary symptoms  Weight stable    Ave SBP: 116 to 120  Continue weekly weight check  Not on diuretic therapy  Continue Low sodium diet

## 2023-06-09 NOTE — ASSESSMENT & PLAN NOTE
HR goal: < 100/min  BP range (Mar-May, 2023) = 105/49 to 134/85  HR range (Mar-May, 2023) = 59 to 106/min  ** 1 episode of HR > 96/min  Patient denies dizziness/racing HR/ SOB/Chest pain  Not on BB or anti-coagulation therapy

## 2023-06-09 NOTE — ASSESSMENT & PLAN NOTE
Patient denies any pain on this visit  Actively ambulatory without difficulty  Continue PRN Tylenol/ Vit D and Calcium daily supplement

## 2023-06-09 NOTE — PROGRESS NOTES
"[address], [address], [place], [address] [place] [address]  [phone]    NAME: Nixon Sr  AGE: 80 y o  SEX: female    Progress Note    [facility]  POS: 13    Assessment/Plan:    Chronic systolic (congestive) heart failure (HCC)  Wt Readings from Last 3 Encounters:   11/15/22 48 1 kg (106 lb)   10/25/22 48 1 kg (106 lb)   10/18/22 48 1 kg (106 lb)   Euvolemic on assessment  Patient reported baseline cardiopulmonary symptoms  Weight stable  Lucendia Ohms SBP: 116 to 120  Continue weekly weight check  Not on diuretic therapy  Continue Low sodium diet    Paroxysmal atrial fibrillation (HCC)  HR goal: < 100/min  BP range (Mar-May, 2023) = 105/49 to 134/85  HR range (Mar-May, 2023) = 59 to 106/min  ** 1 episode of HR > 96/min  Patient denies dizziness/racing HR/ SOB/Chest pain  Not on BB or anti-coagulation therapy  Chronic bilateral low back pain without sciatica  Patient denies any pain on this visit  Actively ambulatory without difficulty  Continue PRN Tylenol/ Vit D and Calcium daily supplement      Chief complaint / Reason for visit: Follow- visit    History of Present Illness: This is an 29-year-old female patient admitted at [facility]  Patient is seen and examined today to follow-up acute on chronic medical conditions: Chronic Systolic CHF, Paroxysmal Atrial Fibrillation, Chronic B/L LBP wo sciatica       Patient is out of bed for this visit actively ambulatory-alert, cooperative, calm, very pleasant and not in distress  Well groomed  Patient is verbal with clear coherent speech -oriented to name/birthday/current date and place  Patient acknowledged feeling well on this visit, however reported Rigth ear discomfort  On assessment, noted a small area of resolving abrasion and a adhered scab on the ear canal cieling where the previously seen \" pimple\" on last visit - denies any other acute medical concerns during ROS assessment including pain   Per nursing, no acute " medical concerns for this visit  Called and left voicemail to patient's POA (daughter) requesting for a return call  Nursing and prior provider notes reviewed on this visit  Discussed visit with PCP and nursing staff/ supervisor  Review of Systems:  Per history of present illness, all other systems reviewed and negative  HISTORY:  Medical Hx: Reviewed, unchanged  Family Hx: Reviewed, unchanged  Soc Hx: Reviewed,  unchanged    ALLERGY: Reviewed, unchanged  Allergies   Allergen Reactions   • Amoxicillin Diarrhea        PHYSICAL EXAM:  Vital Signs: T98 6F -P59 -R16 BP: 136/48 (6/5/2023) SpO2: 97% RA  Weight: 98 0 lbs (6/1/2023) <= 99 3 lbs (5/1/2023) <= 98 6 lbs (4/1/2023)    General: NAD  Well appearing  No acute distress  Frail stature  Head: Atraumatic  Normocephalic  Eye Exam: anicteric sclera, no discharge, PERRLA, No injection  Wears glasses  Oral Exam: moist mucous membranes, no buccaloropharyngeal erythema, palatine tonsils WNL  Neck Exam: no anterior cervical lymphadenopathy noted, neck supple  Trachea midline, no carotid bruit, no masses  Cardiovascular: regular rate, regular rhythm, no murmurs, rubs, or gallops  S1 and S2  Pulmonary: no wheeze, no rhonchi, no rales  No chest tenderness  Normal chest wall expansion  Abdominal: soft, non-tender, nondistended, bowel sounds audible x 4 quadrants  No palpable hepatosplenomegaly, no tympany  : Non distended bladder  Extremities and skin: no edema noted, no rashes  Intact skin  Neurological: alert, cooperative and responsive, Oriented x 3  Cranial Nerves II-XII grossly intact, no tics, normal sensation to pressure and light touch   moving all 4 extremities symmetrically  Ambulates with walker  Laboratory results / Imaging reviewed: Hard copy/ies in medical chart  Last labs done on Sept, 2022  Current Medications: All medications reviewed and updated in Nursing Home Chart    Please note:  This note was completed in part utilizing a "voice-recognition software may have been used in the preparation of this document  Grammatical errors, random word insertion, spelling mistakes, and incomplete sentences may be an occasional consequence of the system secondary to software limitations, ambient noise and hardware issues  Occasional wrong word or \"sound-alike\" substitutions may have occurred due to the inherent limitations of voice recognition software  At the time of dictation, efforts were made to edit, clarify and/or correct errors  Interpretation should be guided by context  Please read the chart carefully and recognize, using context, where substitutions have occurred  If you have any questions or concerns about the context, text or information contained within the body of this dictation, please contact myself, the provider, for further clarification        PAGE Tripp  6/9/2023  "

## 2023-07-10 PROBLEM — H61.21 IMPACTED CERUMEN OF RIGHT EAR: Status: RESOLVED | Noted: 2023-05-11 | Resolved: 2023-07-10

## 2023-10-12 ENCOUNTER — IN HOME VISIT (OUTPATIENT)
Dept: GERIATRICS | Facility: OTHER | Age: 88
End: 2023-10-12
Payer: MEDICARE

## 2023-10-12 DIAGNOSIS — M54.50 CHRONIC BILATERAL LOW BACK PAIN WITHOUT SCIATICA: ICD-10-CM

## 2023-10-12 DIAGNOSIS — I48.0 PAROXYSMAL ATRIAL FIBRILLATION (HCC): ICD-10-CM

## 2023-10-12 DIAGNOSIS — G89.29 CHRONIC BILATERAL LOW BACK PAIN WITHOUT SCIATICA: ICD-10-CM

## 2023-10-12 DIAGNOSIS — I50.22 CHRONIC SYSTOLIC (CONGESTIVE) HEART FAILURE (HCC): Primary | ICD-10-CM

## 2023-10-12 PROCEDURE — 99349 HOME/RES VST EST MOD MDM 40: CPT | Performed by: NURSE PRACTITIONER

## 2023-10-12 NOTE — PROGRESS NOTES
[address], [address] [place] [address], [facility]  [phone]    NAME: Parminder Malhotra  AGE: 80 y.o. SEX: female    Progress Note    Location: [facility])  POS: 13    Assessment/Plan:    Chronic systolic (congestive) heart failure (HCC)  Wt Readings from Last 3 Encounters:   11/15/22 48.1 kg (106 lb)   10/25/22 48.1 kg (106 lb)   10/18/22 48.1 kg (106 lb)   Euvolemic on assessment. Patient reported baseline cardiopulmonary symptoms  Weight stable. Continue monthly weight check  BP range (Jul-Oct, 2023) = 98/54 to 132/45  ** 1 SBP < 100 on this period  HR range (Jul-Oct, 2023) = 55 to 70/min  Not on diuretic therapy. Continue Low sodium diet  Labs due for June, 2023 - missed  Re-order labs: CBC wo diff and BMP on 10/19/2023    Paroxysmal atrial fibrillation (HCC)  HR goal: < 100/min  BP range (Jul-Oct, 2023) = 98/54 to 132/45  ** 1 SBP < 100 on this period  HR range (Jul-Oct, 2023) = 55 to 70/min  Patient denies dizziness/racing HR/ SOB/Chest pain  Not on BB or anti-coagulation therapy. Chronic bilateral low back pain without sciatica  Patient denies any pain on this visit  Actively ambulatory without difficulty  Continue PRN: Tylenol/ Ibuprofen  Continue Vit D and Calcium daily supplement    CKD (chronic kidney disease)  Last renal functions done on Sept, 2022  Crea: 0.83/ BUN: 20/ eGFR: 68  Continue to avoid hypotensive episodes and use of nephrotoxic medications  Continue to maintain adequate hydration  BMP on 10/19/2023      Chief complaint / Reason for visit: Follow- visit    History of Present Illness: This is an 70-year-old female patient residing at [facility]. Patient is seen and examined today to follow-up acute on chronic medical conditions. Patient is out of bed for this visit - well dressed - actively ambulatory - alert, cooperative, calm, very pleasant and not in distress.  Patient is verbal with clear coherent speech -oriented to name/birthday/current date and place. Patient acknowledged feeling well on this visit - denies any acute medical concerns for this visit including pain. Per nursing, no acute medical concerns for this visit. Nursing and prior provider notes reviewed on this visit. Discussed visit with PCP and nursing staff/ supervisor. Review of Systems:  Per history of present illness, all other systems reviewed and negative. HISTORY:  Medical Hx: Reviewed, unchanged  Family Hx: Reviewed, unchanged  Soc Hx: Reviewed,  unchanged    ALLERGY: Reviewed, unchanged  Allergies   Allergen Reactions    Amoxicillin Diarrhea        PHYSICAL EXAM:  Vital Signs: T97.7F -P58 -R16 BP: 108/60 (10/9/2023) SpO2: 97% RA  Weight: 103.7 lbs (10/1/2023) <= 100.3 lbs (9/1/2023) <= 99.0 lbs (8/1/2023)    General: NAD. Well appearing. No acute distress. Frail stature. Head: Atraumatic. Normocephalic. Eye Exam: anicteric sclera, no discharge, PERRLA, No injection. Wears glasses. Oral Exam: moist mucous membranes, no buccaloropharyngeal erythema, palatine tonsils WNL. Neck Exam: no anterior cervical lymphadenopathy noted, neck supple. Trachea midline, no carotid bruit, no masses  Cardiovascular: regular rate, regular rhythm, no murmurs, rubs, or gallops. S1 and S2  Pulmonary: no wheeze, no rhonchi, no rales. No chest tenderness. Normal chest wall expansion  Abdominal: soft, non-tender, nondistended, bowel sounds audible x 4 quadrants. No palpable hepatosplenomegaly, no tympany  : Non distended bladder. Extremities and skin: no edema noted, no rashes. Intact skin  Neurological: alert, cooperative and responsive, Oriented x 3. No tics, normal sensation to pressure and light touch.  moving all 4 extremities symmetrically. Ambulates with walker. Laboratory results / Imaging reviewed: Hard copy/ies in medical chart. Last labs done on Sept, 2022    Current Medications: All medications reviewed and updated in Nursing Home Chart    Please note:  This note was completed in part utilizing a voice-recognition software may have been used in the preparation of this document. Grammatical errors, random word insertion, spelling mistakes, and incomplete sentences may be an occasional consequence of the system secondary to software limitations, ambient noise and hardware issues. Occasional wrong word or "sound-alike" substitutions may have occurred due to the inherent limitations of voice recognition software. At the time of dictation, efforts were made to edit, clarify and/or correct errors. Interpretation should be guided by context. Please read the chart carefully and recognize, using context, where substitutions have occurred. If you have any questions or concerns about the context, text or information contained within the body of this dictation, please contact myself, the provider, for further clarification.       PAGE Muñoz  10/13/2023

## 2023-10-13 RX ORDER — IBUPROFEN 600 MG/1
600 TABLET ORAL EVERY 12 HOURS PRN
COMMUNITY

## 2023-10-13 RX ORDER — CHOLECALCIFEROL (VITAMIN D3) 125 MCG
5000 CAPSULE ORAL WEEKLY
COMMUNITY

## 2023-10-13 NOTE — ASSESSMENT & PLAN NOTE
HR goal: < 100/min  BP range (Jul-Oct, 2023) = 98/54 to 132/45  ** 1 SBP < 100 on this period  HR range (Jul-Oct, 2023) = 55 to 70/min  Patient denies dizziness/racing HR/ SOB/Chest pain  Not on BB or anti-coagulation therapy.

## 2023-10-13 NOTE — ASSESSMENT & PLAN NOTE
Wt Readings from Last 3 Encounters:   11/15/22 48.1 kg (106 lb)   10/25/22 48.1 kg (106 lb)   10/18/22 48.1 kg (106 lb)   Euvolemic on assessment. Patient reported baseline cardiopulmonary symptoms  Weight stable. Continue monthly weight check  BP range (Jul-Oct, 2023) = 98/54 to 132/45  ** 1 SBP < 100 on this period  HR range (Jul-Oct, 2023) = 55 to 70/min  Not on diuretic therapy.  Continue Low sodium diet  Labs due for June, 2023 - missed  Re-order labs: CBC wo diff and BMP on 10/19/2023

## 2023-10-13 NOTE — ASSESSMENT & PLAN NOTE
Patient denies any pain on this visit  Actively ambulatory without difficulty  Continue PRN: Tylenol/ Ibuprofen  Continue Vit D and Calcium daily supplement

## 2023-10-13 NOTE — ASSESSMENT & PLAN NOTE
Last renal functions done on Sept, 2022  Crea: 0.83/ BUN: 20/ eGFR: 68  Continue to avoid hypotensive episodes and use of nephrotoxic medications  Continue to maintain adequate hydration  BMP on 10/19/2023

## 2024-02-01 ENCOUNTER — IN HOME VISIT (OUTPATIENT)
Dept: GERIATRICS | Facility: OTHER | Age: 89
End: 2024-02-01
Payer: MEDICARE

## 2024-02-01 DIAGNOSIS — I50.22 CHRONIC SYSTOLIC (CONGESTIVE) HEART FAILURE (HCC): Primary | ICD-10-CM

## 2024-02-01 DIAGNOSIS — N18.2 STAGE 2 CHRONIC KIDNEY DISEASE: ICD-10-CM

## 2024-02-01 DIAGNOSIS — I48.0 PAROXYSMAL ATRIAL FIBRILLATION (HCC): ICD-10-CM

## 2024-02-01 DIAGNOSIS — D69.6 THROMBOCYTOPENIA (HCC): ICD-10-CM

## 2024-02-01 PROCEDURE — 99349 HOME/RES VST EST MOD MDM 40: CPT | Performed by: NURSE PRACTITIONER

## 2024-02-01 NOTE — PROGRESS NOTES
"[facility]  [address] [place] [address], [address], [place] [address]  [phone]    NAME: Raya Aquino  AGE: 89 y.o. SEX: female    Progress Note    Location: [facility]  POS: 13    Assessment/Plan:    Chronic systolic (congestive) heart failure (HCC)  Wt Readings from Last 3 Encounters:   11/15/22 48.1 kg (106 lb)   10/25/22 48.1 kg (106 lb)   10/18/22 48.1 kg (106 lb)   Weight stable.  Euvolemic on assessment.  Patient reported baseline cardiopulmonary symptoms  Not on diuretic therapy.   Continue Low sodium diet  Renal functions at baseline (10/19/2023): Crea: 1.03/BUN: 21/ eGFR: 52    Paroxysmal atrial fibrillation (HCC)  HR goal: < 100/min  HR range (NOv/23-Jan/24) = 54 to 70/min  BP range (NOv/23-Jan/24) = 106/53 to 127/60  Patient denies dizziness/racing HR/ SOB/Chest pain  Not on BB/ anti-coagulation therapy/ anti-platelet  Previously followed by Cardiology office. No recent visits on review.    CKD (chronic kidney disease)  Lab Results   Component Value Date    EGFR 52 (L) 10/19/2023    EGFR 68 09/06/2022    EGFR 65 03/14/2022    CREATININE 1.03 10/19/2023    CREATININE 0.83 09/06/2022    CREATININE 0.86 03/14/2022   Last renal function (10/19/2023): Crea: 1.03/BUN: 21/eGFR: 52  Continue to avoid hypotensive episodes and use of nephrotoxic medications  BMP on 4/19/2024    Thrombocytopenia (HCC)  Platelet count (10/19/2023) 173 (WNL)  Continue Vit D / MVI daily  CBC on 4/19/2024      Chief complaint / Reason for visit: Follow- visit    History of Present Illness:  This is an 89-year-old female patient residing at [facility].  Patient is seen and examined today to follow-up acute on chronic medical conditions.  Patient is out of bed for this visit sitting in chair in room -well dressed -alert, cooperative, calm, very pleasant and not in distress.  Patient is verbal with clear coherent speech -oriented x 3.  Patient acknowledged feeling well this visit,  \" I'm alright\" " -denies any acute medical concerns during ROS assessment including pain.  Per nursing, patient is described as stable and at baseline -no acute medical concerns for this visit.    Nursing and prior provider notes reviewed on this visit. Discussed visit with PCP and nursing staff/ supervisor.    Review of Systems:  Per history of present illness, all other systems reviewed and negative.    HISTORY:  Medical Hx: Reviewed, unchanged  Family Hx: Reviewed, unchanged  Soc Hx: Reviewed,  unchanged    ALLERGY: Reviewed, unchanged.   Allergies   Allergen Reactions    Amoxicillin Diarrhea        PHYSICAL EXAM:  Vital Signs: T 98.7F -HR 64 -R 16 -BP: 111/62 (1/29/2024) -SpO2: 95% RA  Weight: 98.2 lbs (2/1/2024) <= 98.3 lbs (1/1/2024) <= 97.2 lbs (12/1/2023)    General: NAD. Well appearing. No acute distress. Frail stature.  Head: Atraumatic. Normocephalic.  Eye Exam: anicteric sclera, no discharge, PERRLA, No injection. Wears glasses.  Oral Exam: moist mucous membranes, no buccaloropharyngeal erythema, palatine tonsils WNL.  Neck Exam: no anterior cervical lymphadenopathy noted, neck supple. Trachea midline, no carotid bruit, no masses  Cardiovascular: regular rate, regular rhythm, no: murmurs/ rubs/ gallops. S1 and S2 appreciated.  Pulmonary: no wheeze, no rhonchi, no rales. No chest tenderness. Normal chest wall expansion  Abdominal: soft, non-tender, nondistended, bowel sounds audible x 4 quadrants. No palpable hepatosplenomegaly, no tympany  : Non distended bladder. Continent.  Extremities and skin: no edema noted, no rashes. Intact skin  Neurological: alert, cooperative and responsive, Oriented x 3. No tics, normal sensation to pressure and light touch.  moving all 4 extremities symmetrically. Ambulates with walker.    Laboratory / Imaging results reviewed. Last labs done on Oct/23    Current Medications: All medications reviewed and updated in Nursing Home eMAR.    Please note: This note was completed in part utilizing  "a voice-recognition software may have been used in the preparation of this document. Grammatical errors, random word insertion, spelling mistakes, and incomplete sentences may be an occasional consequence of the system secondary to software limitations, ambient noise and hardware issues. Occasional wrong word or \"sound-alike\" substitutions may have occurred due to the inherent limitations of voice recognition software. At the time of dictation, efforts were made to edit, clarify and/or correct errors. Interpretation should be guided by context. Please read the chart carefully and recognize, using context, where substitutions have occurred. If you have any questions or concerns about the context, text or information contained within the body of this dictation, please contact myself, the provider, for further clarification.      PAGE Luong  2/1/2024  "

## 2024-02-01 NOTE — ASSESSMENT & PLAN NOTE
Lab Results   Component Value Date    EGFR 52 (L) 10/19/2023    EGFR 68 09/06/2022    EGFR 65 03/14/2022    CREATININE 1.03 10/19/2023    CREATININE 0.83 09/06/2022    CREATININE 0.86 03/14/2022   Last renal function (10/19/2023): Crea: 1.03/BUN: 21/eGFR: 52  Continue to avoid hypotensive episodes and use of nephrotoxic medications  BMP on 4/19/2024

## 2024-02-01 NOTE — ASSESSMENT & PLAN NOTE
HR goal: < 100/min  HR range (NOv/23-Jan/24) = 54 to 70/min  BP range (NOv/23-Jan/24) = 106/53 to 127/60  Patient denies dizziness/racing HR/ SOB/Chest pain  Not on BB/ anti-coagulation therapy/ anti-platelet  Previously followed by Cardiology office. No recent visits on review.

## 2024-02-01 NOTE — ASSESSMENT & PLAN NOTE
Wt Readings from Last 3 Encounters:   11/15/22 48.1 kg (106 lb)   10/25/22 48.1 kg (106 lb)   10/18/22 48.1 kg (106 lb)   Weight stable.  Euvolemic on assessment.  Patient reported baseline cardiopulmonary symptoms  Not on diuretic therapy.   Continue Low sodium diet  Renal functions at baseline (10/19/2023): Crea: 1.03/BUN: 21/ eGFR: 52

## 2024-06-27 ENCOUNTER — IN HOME VISIT (OUTPATIENT)
Dept: GERIATRICS | Facility: OTHER | Age: 89
End: 2024-06-27

## 2024-06-27 DIAGNOSIS — N18.2 STAGE 2 CHRONIC KIDNEY DISEASE: ICD-10-CM

## 2024-06-27 DIAGNOSIS — I48.0 PAROXYSMAL ATRIAL FIBRILLATION (HCC): ICD-10-CM

## 2024-06-27 DIAGNOSIS — M54.50 CHRONIC BILATERAL LOW BACK PAIN WITHOUT SCIATICA: ICD-10-CM

## 2024-06-27 DIAGNOSIS — G89.29 CHRONIC BILATERAL LOW BACK PAIN WITHOUT SCIATICA: ICD-10-CM

## 2024-06-27 DIAGNOSIS — I50.22 CHRONIC SYSTOLIC (CONGESTIVE) HEART FAILURE (HCC): Primary | ICD-10-CM

## 2024-06-28 NOTE — ASSESSMENT & PLAN NOTE
Wt Readings from Last 3 Encounters:   11/15/22 48.1 kg (106 lb)   10/25/22 48.1 kg (106 lb)   10/18/22 48.1 kg (106 lb)   Small increments of weight gain on the last 3 months  Euvolemic on assessment.  No B/L LE edema  Patient reported baseline cardiopulmonary symptoms  Not on diuretic therapy.   Continue Low sodium diet  Renal functions at baseline (10/19/2023): Crea: 1.03/BUN: 21/ eGFR: 52

## 2024-06-28 NOTE — ASSESSMENT & PLAN NOTE
Lab Results   Component Value Date    EGFR 52 (L) 10/19/2023    EGFR 68 09/06/2022    EGFR 65 03/14/2022    CREATININE 1.03 10/19/2023    CREATININE 0.83 09/06/2022    CREATININE 0.86 03/14/2022   Last renal function (10/19/2023): Crea: 1.03/BUN: 21/eGFR: 52  Continue to avoid hypotensive episodes and use of nephrotoxic medications  BMP due on 4/19/2024 - missed versus patient declined

## 2024-06-28 NOTE — ASSESSMENT & PLAN NOTE
HR goal: < 100/min  On vmonthly BP/HR checks in [facility]  BP range (Feb-June/2024) = 109/46 to 138/61  HR range (Feb-June/2024) = 56 to 70/min  Patient denies dizziness/racing HR/ SOB/Chest pain  Not on BB/ anti-coagulation therapy/ anti-platelet

## 2024-06-28 NOTE — PROGRESS NOTES
[facility]  [address], [address], [place] [address]  [phone]    NAME: Raya Aquino  AGE: 89 y.o. SEX: female    Progress Note    Location: [facility]  POS: 13    Assessment/Plan:    Chronic systolic (congestive) heart failure (HCC)  Wt Readings from Last 3 Encounters:   11/15/22 48.1 kg (106 lb)   10/25/22 48.1 kg (106 lb)   10/18/22 48.1 kg (106 lb)   Small increments of weight gain on the last 3 months  Euvolemic on assessment.  No B/L LE edema  Patient reported baseline cardiopulmonary symptoms  Not on diuretic therapy.   Continue Low sodium diet  Renal functions at baseline (10/19/2023): Crea: 1.03/BUN: 21/ eGFR: 52    Paroxysmal atrial fibrillation (HCC)  HR goal: < 100/min  On vmonthly BP/HR checks in [facility]  BP range (Feb-June/2024) = 109/46 to 138/61  HR range (Feb-June/2024) = 56 to 70/min  Patient denies dizziness/racing HR/ SOB/Chest pain  Not on BB/ anti-coagulation therapy/ anti-platelet    CKD (chronic kidney disease)  Lab Results   Component Value Date    EGFR 52 (L) 10/19/2023    EGFR 68 09/06/2022    EGFR 65 03/14/2022    CREATININE 1.03 10/19/2023    CREATININE 0.83 09/06/2022    CREATININE 0.86 03/14/2022   Last renal function (10/19/2023): Crea: 1.03/BUN: 21/eGFR: 52  Continue to avoid hypotensive episodes and use of nephrotoxic medications  BMP due on 4/19/2024 - missed versus patient declined    Chronic bilateral low back pain without sciatica  Patient denies any pain  Continue Tylenol 500mg Q8 hours PRN      Chief complaint / Reason for visit:  Follow- visit    History of Present Illness:  This is an 89-year-old female patient residing at [facility].  Patient is seen and examined today to follow-up any acute and chronic medical conditions.  Patient is out of bed for this visit sitting in chair in room -well dressed -alert, cooperative, calm, very pleasant and not in distress.  Patient is verbal with clear coherent speech -oriented x 3.   "Patient acknowledged feeling well this visit,  \" I'm alright\" -denies any acute medical concerns during ROS assessment including pain. Patient reported that she stopped taking her calcoium with Vit D and MVI. Patient had also strongly expressed that she want yearly labs and every 6 months well visit only. Per nursing, no acute medical concerns for this visit.    Nursing and prior provider notes reviewed on this visit. Discussed visit with PCP and nursing staff/ supervisor.    Review of Systems:  Per history of present illness, all other systems reviewed and negative.    HISTORY:  Medical Hx: Reviewed, unchanged  Family Hx: Reviewed, unchanged  Soc Hx: Reviewed,  unchanged    ALLERGY: Reviewed, unchanged.   Allergies   Allergen Reactions    Amoxicillin Diarrhea        PHYSICAL EXAM:  Vital Signs: T 98.2F -HR 60 -R 16 -BP: 109/57 (6/2/2024) -SpO2 95% RA  Weight: 100.0 lbs (6/1/2204) <= 99.2 lbs (5/1/2024) <= 98.3 lbs (4/1/2024)    General: NAD. Well appearing. No acute distress. Frail stature versus very lean  Head: Atraumatic. Normocephalic.  Eye Exam: anicteric sclera, no discharge, PERRLA, No injection. Wears glasses.  Oral Exam: moist mucous membranes, no buccaloropharyngeal erythema, palatine tonsils WNL.  Neck Exam: no anterior cervical lymphadenopathy noted, neck supple. Trachea midline, no carotid bruit, no masses  Cardiovascular: regular rate, regular rhythm, no: murmurs/ rubs/ gallops. S1 and S2 appreciated.  Pulmonary: no wheeze, no rhonchi, no rales. No chest tenderness. Normal chest wall expansion  Abdominal: soft, non-tender, nondistended, bowel sounds audible x 4 quadrants. No palpable hepatosplenomegaly, no tympany  : Non distended bladder. Continent.  Extremities and skin: no edema noted, no rashes. Intact skin  Neurological: alert, cooperative and responsive, Oriented x 3. No tics, normal sensation to pressure and light touch.  moving all 4 extremities symmetrically. Uses walker on loner distance " "but generally independent.    Laboratory / Imaging results reviewed. Last labs done on 10/19/2023    Current Medications: All medications reviewed and updated in Nursing Home eMAR.    Please note: This note was completed in part utilizing a voice-recognition software may have been used in the preparation of this document. Grammatical errors, random word insertion, spelling mistakes, and incomplete sentences may be an occasional consequence of the system secondary to software limitations, ambient noise and hardware issues. Occasional wrong word or \"sound-alike\" substitutions may have occurred due to the inherent limitations of voice recognition software. At the time of dictation, efforts were made to edit, clarify and/or correct errors. Interpretation should be guided by context. Please read the chart carefully and recognize, using context, where substitutions have occurred. If you have any questions or concerns about the context, text or information contained within the body of this dictation, please contact myself, the provider, for further clarification.      PAGE Luong  6/28/2024  "

## 2024-12-11 ENCOUNTER — IN HOME VISIT (OUTPATIENT)
Dept: GERIATRICS | Facility: OTHER | Age: 89
End: 2024-12-11
Payer: MEDICARE

## 2024-12-11 DIAGNOSIS — K59.09 OTHER CONSTIPATION: ICD-10-CM

## 2024-12-11 DIAGNOSIS — G89.29 CHRONIC BILATERAL LOW BACK PAIN WITHOUT SCIATICA: ICD-10-CM

## 2024-12-11 DIAGNOSIS — I50.22 CHRONIC SYSTOLIC (CONGESTIVE) HEART FAILURE (HCC): Primary | ICD-10-CM

## 2024-12-11 DIAGNOSIS — N18.2 STAGE 2 CHRONIC KIDNEY DISEASE: ICD-10-CM

## 2024-12-11 DIAGNOSIS — I48.0 PAROXYSMAL ATRIAL FIBRILLATION (HCC): ICD-10-CM

## 2024-12-11 DIAGNOSIS — R53.83 FATIGUE, UNSPECIFIED TYPE: ICD-10-CM

## 2024-12-11 DIAGNOSIS — M54.50 CHRONIC BILATERAL LOW BACK PAIN WITHOUT SCIATICA: ICD-10-CM

## 2024-12-11 PROCEDURE — 99349 HOME/RES VST EST MOD MDM 40: CPT | Performed by: NURSE PRACTITIONER

## 2024-12-11 NOTE — PROGRESS NOTES
"[facility]  [address] [place] [address], [address], [place] [address]  [phone]    NAME: Raya Aquino  AGE: 89 y.o. SEX: female    Progress Note    Location: [facility]  POS: 13    Assessment/Plan:    Chronic systolic (congestive) heart failure (HCC)  Wt Readings from Last 3 Encounters:  11/15/22 48.1 kg (106 lb)  10/25/22 48.1 kg (106 lb)  10/18/22 48.1 kg (106 lb)  Minimal weight gain on the last 3 months: 2 lbs  Euvolemic on assessment.  No B/L LE edema  Patient reported baseline cardiopulmonary symptoms  Not on diuretic therapy.   Continue Low sodium diet  BMP on 12/12/2024     Paroxysmal atrial fibrillation (HCC)  HR goal: < 100/min  BP range (Oct-Dec/2024) = 106/55 to 141/50  HR range (Oct-Dec/2024) = 58 to 60/min  Patient denies dizziness/racing HR/ SOB/Chest pain  Continue monthly BP/HR checks in [facility]  Not on BB/ anti-coagulation therapy/ anti-platelet     CKD (chronic kidney disease)  Lab Results  Component Value Date    EGFR 52 (L) 10/19/2023    EGFR 68 09/06/2022    EGFR 65 03/14/2022    CREATININE 1.03 10/19/2023    CREATININE 0.83 09/06/2022    CREATININE 0.86 03/14/2022  Last renal function (10/19/2023): Crea: 1.03/BUN: 21/eGFR: 52  Continue to avoid hypotensive episodes and use of nephrotoxic medications  BMP on 12/12/2024     Chronic bilateral low back pain without sciatica  Patient denies any pain  Continue Tylenol 500mg Q8 hours PRN    Other constipation  Patient reports regular BM  Continue Miralax 17G daily    Fatigue  Patient reports \"easily tired\" more than usual  Afebrile. No respiratory symptoms  Slight erythema to nasal and buccaloropharyngeal mucosa.  CBC with diff and BMP on 12/12/2024  V/S daily x 5 days      Chief complaint / Reason for visit:  Follow- visit    History of Present Illness:  This is an 89-year-old female patient residing at [facility].  Patient is seen and examined today to follow-up any acute and chronic medical conditions.  Patient is " "out of bed for this visit sitting in chair in room -well dressed -alert, cooperative, calm, very pleasant and not in distress.  Patient is verbal with clear coherent speech -oriented x 3.  Patient acknowledged feeling well but also reported that \"she feels tired easily more than usual. My head feels foggy too\" - denies any other acute medical concerns during ROS assessment including pain. Patient denies taking any undeclared OTC supplements/ medications, changes in sleep/ appetite and decline in function, /GI related concerns. Assessment noted, slight erythema to Rigth ear, slight ereythema to buccanasalorophyryngeal mucosa today. Patient denies any repsiratory symptoms e.g. cough or congestion. Patient agreeable to labs: CBC with diff, BMP for tomorrow. Per nursing, no acute medical concerns for this visit.    Nursing and prior provider notes reviewed on this visit. Discussed visit with PCP and nursing staff/ supervisor.    Review of Systems:  Per history of present illness, all other systems reviewed and negative.    HISTORY:  Medical Hx: Reviewed, unchanged  Family Hx: Reviewed, unchanged  Soc Hx: Reviewed,  unchanged    ALLERGY: Reviewed, unchanged.   Allergies   Allergen Reactions    Amoxicillin Diarrhea        PHYSICAL EXAM:  Vital Signs: T 98.4F -HR 60 -R 16 -BP: 106/55 (12/2/2024) -SpO2 97% RA  Weight: 93.0 lbs (12/6/2024) <= 93.0 lbs (11/8/2024) <= 91.6 lbs (10/3/2024)    General: NAD. Well appearing. No acute distress. Frail stature versus very lean  Head: Atraumatic. Normocephalic.  Ear: NO impacted cerumen. Right TM sightly red. No effusion or discharges seen.  Eye Exam: anicteric sclera, no discharge, PERRLA, No injection. Wears glasses.  Oral Exam: moist mucous membranes, (+) slight erythema to buccaloropharyngeal erythema, palatine tonsils WNL.  Nose: Slight erythema to nasal mucosa. No rhinorrhea  Neck Exam: no anterior cervical lymphadenopathy noted, neck supple. Trachea midline, no carotid bruit, " "no masses  Cardiovascular: regular rate, regular rhythm, no: murmurs/ rubs/ gallops. S1 and S2 appreciated.  Pulmonary: no wheeze, no rhonchi, no rales. No chest tenderness. Normal chest wall expansion  Abdominal: soft, non-tender, nondistended, bowel sounds audible x 4 quadrants. No palpable hepatosplenomegaly, no tympany  : Non distended bladder. Continent.  Extremities and skin: no edema noted, no rashes. Intact skin  Neurological: alert, cooperative and responsive, Oriented x 3. No tics, normal sensation to pressure and light touch.  moving all 4 extremities symmetrically. Uses walker on loner distance but generally independent.    Laboratory / Imaging results reviewed. Last labs done on Oct/2023. (Patient prefers annual lab tests unless acute medical condition)    Current Medications: All medications reviewed and updated in Nursing Home [facility].    Please note: This note was completed in part utilizing a voice-recognition software may have been used in the preparation of this document. Grammatical errors, random word insertion, spelling mistakes, and incomplete sentences may be an occasional consequence of the system secondary to software limitations, ambient noise and hardware issues. Occasional wrong word or \"sound-alike\" substitutions may have occurred due to the inherent limitations of voice recognition software. At the time of dictation, efforts were made to edit, clarify and/or correct errors. Interpretation should be guided by context. Please read the chart carefully and recognize, using context, where substitutions have occurred. If you have any questions or concerns about the context, text or information contained within the body of this dictation, please contact myself, the provider, for further clarification.      PAGE Luong  2/10/2025  "

## 2025-02-10 RX ORDER — POLYETHYLENE GLYCOL 3350 17 G/17G
17 POWDER, FOR SOLUTION ORAL DAILY PRN
COMMUNITY

## 2025-06-05 ENCOUNTER — IN HOME VISIT (OUTPATIENT)
Dept: GERIATRICS | Facility: OTHER | Age: OVER 89
End: 2025-06-05

## 2025-06-05 DIAGNOSIS — I48.0 PAROXYSMAL ATRIAL FIBRILLATION (HCC): ICD-10-CM

## 2025-06-05 DIAGNOSIS — G89.29 CHRONIC BILATERAL LOW BACK PAIN WITHOUT SCIATICA: ICD-10-CM

## 2025-06-05 DIAGNOSIS — I50.22 CHRONIC SYSTOLIC (CONGESTIVE) HEART FAILURE (HCC): Primary | ICD-10-CM

## 2025-06-05 DIAGNOSIS — M54.50 CHRONIC BILATERAL LOW BACK PAIN WITHOUT SCIATICA: ICD-10-CM

## 2025-06-05 DIAGNOSIS — H92.01 RIGHT EAR PAIN: ICD-10-CM

## 2025-06-05 DIAGNOSIS — N18.2 STAGE 2 CHRONIC KIDNEY DISEASE: ICD-10-CM

## 2025-06-05 DIAGNOSIS — K59.09 OTHER CONSTIPATION: ICD-10-CM

## 2025-06-05 DIAGNOSIS — D69.6 THROMBOCYTOPENIA (HCC): ICD-10-CM

## 2025-06-07 NOTE — ASSESSMENT & PLAN NOTE
Patient denies any pain  Patient had previously requested to stop Tylenol  Currently not on pain medication

## 2025-06-07 NOTE — ASSESSMENT & PLAN NOTE
Wt Readings from Last 3 Encounters:   11/15/22 48.1 kg (106 lb)   10/25/22 48.1 kg (106 lb)   10/18/22 48.1 kg (106 lb)   Euvolemic on assessment.  Weight stable.  No B/L LE edema  Patient reported baseline cardiopulmonary symptoms  Not on diuretic therapy.   Continue Low sodium diet  BMP on 6/10/2025

## 2025-06-07 NOTE — PROGRESS NOTES
[facility]  [address] [place] [address], [address], [place] [address]  [phone]    NAME: Raya Aquino  AGE: 89 y.o. SEX: female    Progress Note    [facility]  POS: 13    Assessment/Plan:    Chronic systolic (congestive) heart failure (HCC)  Wt Readings from Last 3 Encounters:   11/15/22 48.1 kg (106 lb)   10/25/22 48.1 kg (106 lb)   10/18/22 48.1 kg (106 lb)   Euvolemic on assessment.  Weight stable.  No B/L LE edema  Patient reported baseline cardiopulmonary symptoms  Not on diuretic therapy.   Continue Low sodium diet  BMP on 6/10/2025    Paroxysmal atrial fibrillation (HCC)  HR goal: < 100/min  BP range (March to June/2025) = 107/52 to 123/59  HR range (March to June/2025) = 59 to 64/min  Patient denies dizziness/racing HR/ SOB/Chest pain  Continue monthly BP/HR checks in [facility]  Not on BB/ anti-coagulation therapy/ anti-platelet    CKD (chronic kidney disease)  Lab Results   Component Value Date    EGFR 52 (L) 10/19/2023    EGFR 68 09/06/2022    EGFR 65 03/14/2022    CREATININE 1.03 10/19/2023    CREATININE 0.83 09/06/2022    CREATININE 0.86 03/14/2022   Last renal function (12/12/2024): Crea: 0.81/BUN: 15/eGFR: 69  Continue to avoid hypotensive episodes and use of nephrotoxic medications  BMP on 6/10/2025    Chronic bilateral low back pain without sciatica  Patient denies any pain  Patient had previously requested to stop Tylenol  Currently not on pain medication    Other constipation  Patient reports regular BM today  Per nursing, patient had requested discontinuation of scheduled Miralax 17G daily  Continue PRN Miralax 17G daily    Thrombocytopenia (HCC)  Last Platelet count (12/12/2024) = 186 <= 173 (10/19/2023)  No anemia (12/12/2024)    Right ear pain  Patient reported right ear discomfort -not painful but more of reduced hearing  On assessment of both ears: No impacted cerumen, no signs of infection, intact tympanic membranes  Consult Audiometry office for further evaluation  and management      Chief complaint / Reason for visit:  Follow- visit    History of Present Illness:  This is an 89-year-old female patient residing at [facility].  Patient is seen and examined today to follow-up any acute and chronic medical conditions.  Patient is out of bed for this visit sitting in chair in room -well dressed -alert, cooperative, calm, very pleasant and not in distress.  Patient is verbal with clear coherent speech -oriented x 3.  Patient acknowledged feeling well on this visit - denies any acute medical concerns during ROS assessment incluidng pain. Patient did mention that she has discomfort on her Right ear snd reduced hearing. Assessment to both ears showed no impacted cerumen - there is thin dry flaky skin adhered to ear canal wall on the right ear - both tympanic membrances pearlescent, no erythema, no bulging, intact, no drainage or any signds of infection. Discussed referral to Audiometry - script given to nursing supervisor - will call and update daughter (POA). Per nursing, no acute medical concerns for this visit.    Nursing and prior provider notes reviewed on this visit. Discussed visit with PCP and nursing staff/ supervisor.    Review of Systems:  Per history of present illness, all other systems reviewed and negative.    HISTORY:  Medical Hx: Reviewed, unchanged  Family Hx: Reviewed, unchanged  Soc Hx: Reviewed,  unchanged    ALLERGY: Reviewed, unchanged. Allergies[1]     PHYSICAL EXAM:  Vital Signs: T 98.2F -HR 59 -R 16 -BP: 123/59 (6/2/2025) -SpO2 94% RA  Weight: 96.9 lbs (6/5/2025) <= 93.9 lbs (5/9/2025) <= 96.2 lbs (4/18/2025)    General: NAD. Well appearing. No acute distress. Frail stature versus very lean  Head: Atraumatic. Normocephalic.  Ear: NO impacted cerumen. B/L tympanic membrance WNL - intact, no signs of bulging, effusion or erythema. No discharges seen.  Eye Exam: anicteric sclera, no discharge, PERRLA, No injection. Wears glasses.  Oral Exam:  "moist mucous membranes, No erythema to buccaloropharyngeal erythema, palatine tonsils WNL.  Neck Exam: no anterior cervical lymphadenopathy noted, neck supple. Trachea midline, no carotid bruit, no masses  Cardiovascular: regular rate, regular rhythm, no: murmurs/ rubs/ gallops. S1 and S2 appreciated.  Pulmonary: no wheeze, no rhonchi, no rales. No chest tenderness. Normal chest wall expansion  Abdominal: soft, non-tender, nondistended, bowel sounds audible x 4 quadrants. No palpable hepatosplenomegaly, no tympany  : Non distended bladder.  Extremities and skin: no edema noted, no rashes. Intact skin  Neurological: alert, cooperative and responsive, Oriented x 3. No tics, normal sensation to pressure and light touch.  moving all 4 extremities symmetrically. Uses walker on longer distance but generally independent.    Laboratory / Imaging results reviewed. Last labs done on Dec/2024.    Current Medications: All medications reviewed and updated in Nursing Home e[facility].    Please note: This note was completed in part utilizing a voice-recognition software may have been used in the preparation of this document. Grammatical errors, random word insertion, spelling mistakes, and incomplete sentences may be an occasional consequence of the system secondary to software limitations, ambient noise and hardware issues. Occasional wrong word or \"sound-alike\" substitutions may have occurred due to the inherent limitations of voice recognition software. At the time of dictation, efforts were made to edit, clarify and/or correct errors. Interpretation should be guided by context. Please read the chart carefully and recognize, using context, where substitutions have occurred. If you have any questions or concerns about the context, text or information contained within the body of this dictation, please contact myself, the provider, for further clarification.      PAGE Luong  6/7/2025         [1]   Allergies  Allergen " Reactions    Amoxicillin Diarrhea

## 2025-06-07 NOTE — ASSESSMENT & PLAN NOTE
HR goal: < 100/min  BP range (March to June/2025) = 107/52 to 123/59  HR range (March to June/2025) = 59 to 64/min  Patient denies dizziness/racing HR/ SOB/Chest pain  Continue monthly BP/HR checks in [facility]  Not on BB/ anti-coagulation therapy/ anti-platelet

## 2025-06-07 NOTE — ASSESSMENT & PLAN NOTE
Lab Results   Component Value Date    EGFR 52 (L) 10/19/2023    EGFR 68 09/06/2022    EGFR 65 03/14/2022    CREATININE 1.03 10/19/2023    CREATININE 0.83 09/06/2022    CREATININE 0.86 03/14/2022   Last renal function (12/12/2024): Crea: 0.81/BUN: 15/eGFR: 69  Continue to avoid hypotensive episodes and use of nephrotoxic medications  BMP on 6/10/2025

## 2025-06-07 NOTE — ASSESSMENT & PLAN NOTE
Patient reports regular BM today  Per nursing, patient had requested discontinuation of scheduled Miralax 17G daily  Continue PRN Miralax 17G daily

## 2025-06-07 NOTE — ASSESSMENT & PLAN NOTE
Patient reported right ear discomfort -not painful but more of reduced hearing  On assessment of both ears: No impacted cerumen, no signs of infection, intact tympanic membranes  Consult Audiometry office for further evaluation and management

## 2025-07-28 ENCOUNTER — DOCUMENTATION (OUTPATIENT)
Dept: ADMINISTRATIVE | Facility: OTHER | Age: OVER 89
End: 2025-07-28